# Patient Record
Sex: FEMALE | Race: WHITE | NOT HISPANIC OR LATINO | Employment: OTHER | URBAN - METROPOLITAN AREA
[De-identification: names, ages, dates, MRNs, and addresses within clinical notes are randomized per-mention and may not be internally consistent; named-entity substitution may affect disease eponyms.]

---

## 2017-06-25 ENCOUNTER — HOSPITAL ENCOUNTER (EMERGENCY)
Facility: HOSPITAL | Age: 57
Discharge: ANOTHER HOSPITAL | End: 2017-06-25
Attending: EMERGENCY MEDICINE
Payer: MEDICAID

## 2017-06-25 VITALS
OXYGEN SATURATION: 95 % | BODY MASS INDEX: 43.58 KG/M2 | WEIGHT: 270 LBS | HEART RATE: 80 BPM | RESPIRATION RATE: 17 BRPM | SYSTOLIC BLOOD PRESSURE: 130 MMHG | TEMPERATURE: 97 F | DIASTOLIC BLOOD PRESSURE: 70 MMHG

## 2017-06-25 DIAGNOSIS — R06.89 HYPERCAPNIA: Primary | ICD-10-CM

## 2017-06-25 LAB
ALBUMIN SERPL BCP-MCNC: 3.2 G/DL
ALLENS TEST: ABNORMAL
ALP SERPL-CCNC: 63 U/L
ALT SERPL W/O P-5'-P-CCNC: 8 U/L
AMPHET+METHAMPHET UR QL: NEGATIVE
ANION GAP SERPL CALC-SCNC: 9 MMOL/L
ANISOCYTOSIS BLD QL SMEAR: SLIGHT
AST SERPL-CCNC: 11 U/L
BACTERIA #/AREA URNS HPF: ABNORMAL /HPF
BARBITURATES UR QL SCN>200 NG/ML: NEGATIVE
BASO STIPL BLD QL SMEAR: ABNORMAL
BASOPHILS NFR BLD: 0 %
BENZODIAZ UR QL SCN>200 NG/ML: NORMAL
BILIRUB SERPL-MCNC: 0.3 MG/DL
BILIRUB UR QL STRIP: NEGATIVE
BUN SERPL-MCNC: 42 MG/DL
BZE UR QL SCN: NEGATIVE
CALCIUM SERPL-MCNC: 9.4 MG/DL
CANNABINOIDS UR QL SCN: NEGATIVE
CHLORIDE SERPL-SCNC: 95 MMOL/L
CLARITY UR: CLEAR
CO2 SERPL-SCNC: 40 MMOL/L
COLOR UR: YELLOW
CREAT SERPL-MCNC: 1 MG/DL
CREAT UR-MCNC: 27.3 MG/DL
DELSYS: ABNORMAL
DIFFERENTIAL METHOD: ABNORMAL
EOSINOPHIL NFR BLD: 4 %
ERYTHROCYTE [DISTWIDTH] IN BLOOD BY AUTOMATED COUNT: 16.4 %
EST. GFR  (AFRICAN AMERICAN): >60 ML/MIN/1.73 M^2
EST. GFR  (NON AFRICAN AMERICAN): >60 ML/MIN/1.73 M^2
FIO2: 28 (ref 21–100)
GIANT PLATELETS BLD QL SMEAR: PRESENT
GLUCOSE SERPL-MCNC: 111 MG/DL
GLUCOSE UR QL STRIP: NEGATIVE
HCO3 UR-SCNC: 47.8 MEQ/L (ref 22–26)
HCT VFR BLD AUTO: 35.6 %
HGB BLD-MCNC: 10.5 G/DL
HGB UR QL STRIP: NEGATIVE
HYPOCHROMIA BLD QL SMEAR: ABNORMAL
KETONES UR QL STRIP: NEGATIVE
LEUKOCYTE ESTERASE UR QL STRIP: ABNORMAL
LYMPHOCYTES NFR BLD: 61 %
MCH RBC QN AUTO: 29.2 PG
MCHC RBC AUTO-ENTMCNC: 29.5 %
MCV RBC AUTO: 99 FL
METHADONE UR QL SCN>300 NG/ML: NEGATIVE
MICROSCOPIC COMMENT: ABNORMAL
MODE: ABNORMAL
MONOCYTES NFR BLD: 5 %
NEUTROPHILS NFR BLD: 27 %
NEUTS BAND NFR BLD MANUAL: 3 %
NITRITE UR QL STRIP: NEGATIVE
OPIATES UR QL SCN: NORMAL
PCO2 BLDA: 95 MMHG (ref 35–45)
PCP UR QL SCN>25 NG/ML: NEGATIVE
PH SMN: 7.31 [PH] (ref 7.35–7.45)
PH UR STRIP: 6 [PH] (ref 5–8)
PLATELET # BLD AUTO: 199 K/UL
PLATELET BLD QL SMEAR: ABNORMAL
PMV BLD AUTO: 13 FL
PO2 BLDA: 88 MMHG (ref 80–100)
POC BE: 17.1 MEQ/L (ref -2–2)
POC SATURATED O2: 96 % (ref 90–100)
POCT GLUCOSE: 125 MG/DL (ref 70–110)
POCT GLUCOSE: 74 MG/DL (ref 70–110)
POCT GLUCOSE: 98 MG/DL (ref 70–110)
POTASSIUM SERPL-SCNC: 4.9 MMOL/L
PROT SERPL-MCNC: 6.4 G/DL
PROT UR QL STRIP: NEGATIVE
RBC # BLD AUTO: 3.59 M/UL
RBC #/AREA URNS HPF: 1 /HPF (ref 0–4)
SCHISTOCYTES BLD QL SMEAR: ABNORMAL
SITE: ABNORMAL
SODIUM SERPL-SCNC: 144 MMOL/L
SP GR UR STRIP: 1.01 (ref 1–1.03)
SPHEROCYTES BLD QL SMEAR: ABNORMAL
SQUAMOUS #/AREA URNS HPF: 1 /HPF
STOMATOCYTES BLD QL SMEAR: PRESENT
TOXICOLOGY INFORMATION: NORMAL
URN SPEC COLLECT METH UR: ABNORMAL
UROBILINOGEN UR STRIP-ACNC: NEGATIVE EU/DL
WBC # BLD AUTO: 12.47 K/UL
WBC #/AREA URNS HPF: 1 /HPF (ref 0–5)
WBC TOXIC VACUOLES BLD QL SMEAR: PRESENT

## 2017-06-25 PROCEDURE — 82962 GLUCOSE BLOOD TEST: CPT

## 2017-06-25 PROCEDURE — 85007 BL SMEAR W/DIFF WBC COUNT: CPT

## 2017-06-25 PROCEDURE — 36600 WITHDRAWAL OF ARTERIAL BLOOD: CPT | Mod: 59

## 2017-06-25 PROCEDURE — 99900035 HC TECH TIME PER 15 MIN (STAT)

## 2017-06-25 PROCEDURE — 27000190 HC CPAP FULL FACE MASK W/VALVE

## 2017-06-25 PROCEDURE — 94660 CPAP INITIATION&MGMT: CPT

## 2017-06-25 PROCEDURE — 94640 AIRWAY INHALATION TREATMENT: CPT

## 2017-06-25 PROCEDURE — 81000 URINALYSIS NONAUTO W/SCOPE: CPT | Mod: 59

## 2017-06-25 PROCEDURE — 36415 COLL VENOUS BLD VENIPUNCTURE: CPT

## 2017-06-25 PROCEDURE — 25000003 PHARM REV CODE 250: Performed by: EMERGENCY MEDICINE

## 2017-06-25 PROCEDURE — 80307 DRUG TEST PRSMV CHEM ANLYZR: CPT

## 2017-06-25 PROCEDURE — 51702 INSERT TEMP BLADDER CATH: CPT

## 2017-06-25 PROCEDURE — 80053 COMPREHEN METABOLIC PANEL: CPT

## 2017-06-25 PROCEDURE — 82803 BLOOD GASES ANY COMBINATION: CPT | Performed by: EMERGENCY MEDICINE

## 2017-06-25 PROCEDURE — 85027 COMPLETE CBC AUTOMATED: CPT

## 2017-06-25 PROCEDURE — 99285 EMERGENCY DEPT VISIT HI MDM: CPT | Mod: 25

## 2017-06-25 RX ORDER — HYDROCODONE BITARTRATE AND ACETAMINOPHEN 10; 325 MG/1; MG/1
1 TABLET ORAL
Status: ON HOLD | COMMUNITY
End: 2017-06-28 | Stop reason: HOSPADM

## 2017-06-25 RX ORDER — LANOLIN ALCOHOL/MO/W.PET/CERES
400 CREAM (GRAM) TOPICAL DAILY
Status: ON HOLD | COMMUNITY
End: 2017-06-28 | Stop reason: HOSPADM

## 2017-06-25 RX ORDER — FUROSEMIDE 40 MG/1
40 TABLET ORAL 2 TIMES DAILY
Status: ON HOLD | COMMUNITY
End: 2017-06-28 | Stop reason: HOSPADM

## 2017-06-25 RX ORDER — PROMETHAZINE HYDROCHLORIDE 25 MG/1
25 TABLET ORAL EVERY 4 HOURS
Status: ON HOLD | COMMUNITY
End: 2017-06-28 | Stop reason: HOSPADM

## 2017-06-25 RX ORDER — DIAZEPAM 5 MG/1
5 TABLET ORAL 3 TIMES DAILY PRN
Status: ON HOLD | COMMUNITY
End: 2017-06-28 | Stop reason: HOSPADM

## 2017-06-25 RX ORDER — BACLOFEN 10 MG/1
10 TABLET ORAL 3 TIMES DAILY
Status: ON HOLD | COMMUNITY
End: 2017-06-28 | Stop reason: HOSPADM

## 2017-06-25 RX ORDER — IPRATROPIUM BROMIDE 0.5 MG/2.5ML
500 SOLUTION RESPIRATORY (INHALATION) 4 TIMES DAILY
Status: ON HOLD | COMMUNITY
End: 2018-10-17 | Stop reason: HOSPADM

## 2017-06-25 RX ORDER — LEVALBUTEROL INHALATION SOLUTION 0.63 MG/3ML
1.25 SOLUTION RESPIRATORY (INHALATION)
Status: COMPLETED | OUTPATIENT
Start: 2017-06-25 | End: 2017-06-25

## 2017-06-25 RX ORDER — ESCITALOPRAM OXALATE 10 MG/1
10 TABLET ORAL NIGHTLY
Status: ON HOLD | COMMUNITY
End: 2019-05-28 | Stop reason: HOSPADM

## 2017-06-25 RX ORDER — CIPROFLOXACIN 500 MG/1
500 TABLET ORAL
Status: ON HOLD | COMMUNITY
End: 2017-06-28 | Stop reason: HOSPADM

## 2017-06-25 RX ORDER — FLUTICASONE PROPIONATE 50 UG/1
POWDER, METERED RESPIRATORY (INHALATION)
Status: ON HOLD | COMMUNITY
End: 2017-06-28 | Stop reason: HOSPADM

## 2017-06-25 RX ORDER — LISINOPRIL 2.5 MG/1
2.5 TABLET ORAL DAILY
Status: ON HOLD | COMMUNITY
End: 2017-09-30

## 2017-06-25 RX ORDER — GABAPENTIN 300 MG/1
300 CAPSULE ORAL 3 TIMES DAILY
COMMUNITY

## 2017-06-25 RX ADMIN — LEVALBUTEROL HYDROCHLORIDE 1.25 MG: 0.63 SOLUTION RESPIRATORY (INHALATION) at 08:06
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
## 2017-06-26 ENCOUNTER — HOSPITAL ENCOUNTER (INPATIENT)
Facility: HOSPITAL | Age: 57
LOS: 2 days | Discharge: NURSING, REHAB OR LONG-TERM CARE | DRG: 189 | End: 2017-06-28
Attending: FAMILY MEDICINE | Admitting: FAMILY MEDICINE
Payer: MEDICAID

## 2017-06-26 DIAGNOSIS — I50.9 CHF (CONGESTIVE HEART FAILURE): ICD-10-CM

## 2017-06-26 DIAGNOSIS — J44.9 CHRONIC OBSTRUCTIVE PULMONARY DISEASE, UNSPECIFIED COPD TYPE: ICD-10-CM

## 2017-06-26 DIAGNOSIS — E03.9 ACQUIRED HYPOTHYROIDISM: ICD-10-CM

## 2017-06-26 DIAGNOSIS — F31.9 BIPOLAR 1 DISORDER: ICD-10-CM

## 2017-06-26 DIAGNOSIS — J96.92 HYPERCAPNIC RESPIRATORY FAILURE: ICD-10-CM

## 2017-06-26 DIAGNOSIS — I10 ESSENTIAL HYPERTENSION: ICD-10-CM

## 2017-06-26 DIAGNOSIS — J96.22 ACUTE ON CHRONIC RESPIRATORY FAILURE WITH HYPERCAPNIA: ICD-10-CM

## 2017-06-26 DIAGNOSIS — R56.9 SEIZURE: ICD-10-CM

## 2017-06-26 DIAGNOSIS — J96.02 ACUTE RESPIRATORY FAILURE WITH HYPERCAPNIA: ICD-10-CM

## 2017-06-26 DIAGNOSIS — J42 CHRONIC BRONCHITIS, UNSPECIFIED CHRONIC BRONCHITIS TYPE: ICD-10-CM

## 2017-06-26 DIAGNOSIS — J96.92 RESPIRATORY FAILURE WITH HYPERCAPNIA, UNSPECIFIED CHRONICITY: ICD-10-CM

## 2017-06-26 DIAGNOSIS — G93.40 ACUTE ENCEPHALOPATHY: ICD-10-CM

## 2017-06-26 LAB
ALBUMIN SERPL BCP-MCNC: 3 G/DL
ALBUMIN SERPL BCP-MCNC: 3.3 G/DL
ALLENS TEST: ABNORMAL
ALLENS TEST: ABNORMAL
ALP SERPL-CCNC: 57 U/L
ALP SERPL-CCNC: 67 U/L
ALT SERPL W/O P-5'-P-CCNC: 10 U/L
ALT SERPL W/O P-5'-P-CCNC: 11 U/L
AMMONIA PLAS-SCNC: 100 UMOL/L
AMMONIA PLAS-SCNC: 44 UMOL/L
ANION GAP SERPL CALC-SCNC: 7 MMOL/L
ANION GAP SERPL CALC-SCNC: 8 MMOL/L
AST SERPL-CCNC: 14 U/L
AST SERPL-CCNC: 14 U/L
BASOPHILS # BLD AUTO: 0.03 K/UL
BASOPHILS NFR BLD: 0.3 %
BILIRUB SERPL-MCNC: 0.3 MG/DL
BILIRUB SERPL-MCNC: 0.3 MG/DL
BUN SERPL-MCNC: 32 MG/DL
BUN SERPL-MCNC: 43 MG/DL
CALCIUM SERPL-MCNC: 10.1 MG/DL
CALCIUM SERPL-MCNC: 9.5 MG/DL
CHLORIDE SERPL-SCNC: 95 MMOL/L
CHLORIDE SERPL-SCNC: 98 MMOL/L
CO2 SERPL-SCNC: 39 MMOL/L
CO2 SERPL-SCNC: 42 MMOL/L
CREAT SERPL-MCNC: 0.9 MG/DL
CREAT SERPL-MCNC: 0.9 MG/DL
DELSYS: ABNORMAL
DELSYS: ABNORMAL
DIFFERENTIAL METHOD: ABNORMAL
EOSINOPHIL # BLD AUTO: 0.3 K/UL
EOSINOPHIL NFR BLD: 3 %
EP: 5
EP: 5
ERYTHROCYTE [DISTWIDTH] IN BLOOD BY AUTOMATED COUNT: 16.1 %
ERYTHROCYTE [SEDIMENTATION RATE] IN BLOOD BY WESTERGREN METHOD: 4 MM/H
ERYTHROCYTE [SEDIMENTATION RATE] IN BLOOD BY WESTERGREN METHOD: 4 MM/H
EST. GFR  (AFRICAN AMERICAN): >60 ML/MIN/1.73 M^2
EST. GFR  (AFRICAN AMERICAN): >60 ML/MIN/1.73 M^2
EST. GFR  (NON AFRICAN AMERICAN): >60 ML/MIN/1.73 M^2
EST. GFR  (NON AFRICAN AMERICAN): >60 ML/MIN/1.73 M^2
FIO2: 35
FIO2: 40
GLUCOSE SERPL-MCNC: 101 MG/DL
GLUCOSE SERPL-MCNC: 67 MG/DL
HCO3 UR-SCNC: 43.8 MMOL/L (ref 24–28)
HCO3 UR-SCNC: 46.9 MMOL/L (ref 24–28)
HCT VFR BLD AUTO: 34 %
HGB BLD-MCNC: 10.2 G/DL
IP: 15
IP: 15
LYMPHOCYTES # BLD AUTO: 6 K/UL
LYMPHOCYTES NFR BLD: 56.6 %
MAGNESIUM SERPL-MCNC: 1.5 MG/DL
MCH RBC QN AUTO: 29.1 PG
MCHC RBC AUTO-ENTMCNC: 30 %
MCV RBC AUTO: 97 FL
MIN VOL: 7
MODE: ABNORMAL
MODE: ABNORMAL
MONOCYTES # BLD AUTO: 1.2 K/UL
MONOCYTES NFR BLD: 11.8 %
NEUTROPHILS # BLD AUTO: 3 K/UL
NEUTROPHILS NFR BLD: 28.3 %
PCO2 BLDA: 79.1 MMHG (ref 35–45)
PCO2 BLDA: 93.5 MMHG (ref 35–45)
PH SMN: 7.31 [PH] (ref 7.35–7.45)
PH SMN: 7.35 [PH] (ref 7.35–7.45)
PHOSPHATE SERPL-MCNC: 5 MG/DL
PLATELET # BLD AUTO: 199 K/UL
PMV BLD AUTO: 12.9 FL
PO2 BLDA: 103 MMHG (ref 80–100)
PO2 BLDA: 80 MMHG (ref 80–100)
POC BE: 18 MMOL/L
POC BE: 21 MMOL/L
POC SATURATED O2: 94 % (ref 95–100)
POC SATURATED O2: 97 % (ref 95–100)
POC TCO2: 46 MMOL/L (ref 23–27)
POC TCO2: 50 MMOL/L (ref 23–27)
POCT GLUCOSE: 131 MG/DL (ref 70–110)
POCT GLUCOSE: 156 MG/DL (ref 70–110)
POCT GLUCOSE: 172 MG/DL (ref 70–110)
POCT GLUCOSE: 74 MG/DL (ref 70–110)
POCT GLUCOSE: 77 MG/DL (ref 70–110)
POCT GLUCOSE: 80 MG/DL (ref 70–110)
POCT GLUCOSE: 86 MG/DL (ref 70–110)
POCT GLUCOSE: 89 MG/DL (ref 70–110)
POTASSIUM SERPL-SCNC: 4.7 MMOL/L
POTASSIUM SERPL-SCNC: 4.7 MMOL/L
PROT SERPL-MCNC: 6.1 G/DL
PROT SERPL-MCNC: 6.9 G/DL
RBC # BLD AUTO: 3.5 M/UL
SAMPLE: ABNORMAL
SAMPLE: ABNORMAL
SITE: ABNORMAL
SITE: ABNORMAL
SODIUM SERPL-SCNC: 144 MMOL/L
SODIUM SERPL-SCNC: 145 MMOL/L
SP02: 100
SP02: 100
SPONT RATE: 23
SPONT RATE: 6
T4 FREE SERPL-MCNC: 0.74 NG/DL
TSH SERPL DL<=0.005 MIU/L-ACNC: 1.26 UIU/ML
VALPROATE SERPL-MCNC: 40 UG/ML
WBC # BLD AUTO: 10.55 K/UL

## 2017-06-26 PROCEDURE — 84439 ASSAY OF FREE THYROXINE: CPT

## 2017-06-26 PROCEDURE — 80053 COMPREHEN METABOLIC PANEL: CPT

## 2017-06-26 PROCEDURE — 27000221 HC OXYGEN, UP TO 24 HOURS

## 2017-06-26 PROCEDURE — 94660 CPAP INITIATION&MGMT: CPT

## 2017-06-26 PROCEDURE — 27000190 HC CPAP FULL FACE MASK W/VALVE

## 2017-06-26 PROCEDURE — 82140 ASSAY OF AMMONIA: CPT

## 2017-06-26 PROCEDURE — 80164 ASSAY DIPROPYLACETIC ACD TOT: CPT

## 2017-06-26 PROCEDURE — 85025 COMPLETE CBC W/AUTO DIFF WBC: CPT

## 2017-06-26 PROCEDURE — 94761 N-INVAS EAR/PLS OXIMETRY MLT: CPT

## 2017-06-26 PROCEDURE — 94640 AIRWAY INHALATION TREATMENT: CPT

## 2017-06-26 PROCEDURE — 36600 WITHDRAWAL OF ARTERIAL BLOOD: CPT

## 2017-06-26 PROCEDURE — 93306 TTE W/DOPPLER COMPLETE: CPT

## 2017-06-26 PROCEDURE — 82803 BLOOD GASES ANY COMBINATION: CPT

## 2017-06-26 PROCEDURE — 82140 ASSAY OF AMMONIA: CPT | Mod: 91

## 2017-06-26 PROCEDURE — 84100 ASSAY OF PHOSPHORUS: CPT

## 2017-06-26 PROCEDURE — 25000242 PHARM REV CODE 250 ALT 637 W/ HCPCS: Performed by: FAMILY MEDICINE

## 2017-06-26 PROCEDURE — 63600175 PHARM REV CODE 636 W HCPCS: Performed by: FAMILY MEDICINE

## 2017-06-26 PROCEDURE — 25000003 PHARM REV CODE 250: Performed by: FAMILY MEDICINE

## 2017-06-26 PROCEDURE — 63600175 PHARM REV CODE 636 W HCPCS: Performed by: INTERNAL MEDICINE

## 2017-06-26 PROCEDURE — 80053 COMPREHEN METABOLIC PANEL: CPT | Mod: 91

## 2017-06-26 PROCEDURE — 93306 TTE W/DOPPLER COMPLETE: CPT | Mod: 26,,, | Performed by: INTERNAL MEDICINE

## 2017-06-26 PROCEDURE — 84443 ASSAY THYROID STIM HORMONE: CPT

## 2017-06-26 PROCEDURE — 83735 ASSAY OF MAGNESIUM: CPT

## 2017-06-26 PROCEDURE — 36415 COLL VENOUS BLD VENIPUNCTURE: CPT

## 2017-06-26 PROCEDURE — 20000000 HC ICU ROOM

## 2017-06-26 PROCEDURE — 99900035 HC TECH TIME PER 15 MIN (STAT)

## 2017-06-26 PROCEDURE — 87086 URINE CULTURE/COLONY COUNT: CPT

## 2017-06-26 RX ORDER — ASPIRIN 81 MG/1
81 TABLET ORAL DAILY
Status: DISCONTINUED | OUTPATIENT
Start: 2017-06-26 | End: 2017-06-28 | Stop reason: HOSPADM

## 2017-06-26 RX ORDER — LISINOPRIL 2.5 MG/1
2.5 TABLET ORAL DAILY
Status: DISCONTINUED | OUTPATIENT
Start: 2017-06-26 | End: 2017-06-26

## 2017-06-26 RX ORDER — LORAZEPAM 0.5 MG/1
0.5 TABLET ORAL NIGHTLY PRN
Status: DISCONTINUED | OUTPATIENT
Start: 2017-06-26 | End: 2017-06-26

## 2017-06-26 RX ORDER — SODIUM CHLORIDE 0.9 % (FLUSH) 0.9 %
3 SYRINGE (ML) INJECTION EVERY 8 HOURS
Status: DISCONTINUED | OUTPATIENT
Start: 2017-06-26 | End: 2017-06-28 | Stop reason: HOSPADM

## 2017-06-26 RX ORDER — NALOXONE HCL 0.4 MG/ML
0.4 VIAL (ML) INJECTION
Status: DISCONTINUED | OUTPATIENT
Start: 2017-06-26 | End: 2017-06-26

## 2017-06-26 RX ORDER — IPRATROPIUM BROMIDE AND ALBUTEROL SULFATE 2.5; .5 MG/3ML; MG/3ML
3 SOLUTION RESPIRATORY (INHALATION) EVERY 4 HOURS PRN
Status: DISCONTINUED | OUTPATIENT
Start: 2017-06-26 | End: 2017-06-28 | Stop reason: HOSPADM

## 2017-06-26 RX ORDER — ALLOPURINOL 100 MG/1
100 TABLET ORAL 2 TIMES DAILY
Status: DISCONTINUED | OUTPATIENT
Start: 2017-06-26 | End: 2017-06-26

## 2017-06-26 RX ORDER — BACLOFEN 10 MG/1
10 TABLET ORAL 3 TIMES DAILY
Status: DISCONTINUED | OUTPATIENT
Start: 2017-06-26 | End: 2017-06-26

## 2017-06-26 RX ORDER — LEVOTHYROXINE SODIUM 100 UG/1
100 TABLET ORAL
Status: DISCONTINUED | OUTPATIENT
Start: 2017-06-26 | End: 2017-06-28 | Stop reason: HOSPADM

## 2017-06-26 RX ORDER — CIPROFLOXACIN 2 MG/ML
400 INJECTION, SOLUTION INTRAVENOUS
Status: DISCONTINUED | OUTPATIENT
Start: 2017-06-26 | End: 2017-06-26

## 2017-06-26 RX ORDER — IPRATROPIUM BROMIDE 0.5 MG/2.5ML
500 SOLUTION RESPIRATORY (INHALATION) 4 TIMES DAILY
Status: DISCONTINUED | OUTPATIENT
Start: 2017-06-26 | End: 2017-06-26

## 2017-06-26 RX ORDER — ENOXAPARIN SODIUM 100 MG/ML
40 INJECTION SUBCUTANEOUS EVERY 24 HOURS
Status: DISCONTINUED | OUTPATIENT
Start: 2017-06-26 | End: 2017-06-28 | Stop reason: HOSPADM

## 2017-06-26 RX ORDER — ZOLPIDEM TARTRATE 5 MG/1
10 TABLET ORAL NIGHTLY PRN
Status: DISCONTINUED | OUTPATIENT
Start: 2017-06-26 | End: 2017-06-26

## 2017-06-26 RX ORDER — DIVALPROEX SODIUM 500 MG/1
1500 TABLET, FILM COATED, EXTENDED RELEASE ORAL 2 TIMES DAILY
Status: DISCONTINUED | OUTPATIENT
Start: 2017-06-26 | End: 2017-06-28 | Stop reason: HOSPADM

## 2017-06-26 RX ORDER — ACETAMINOPHEN 325 MG/1
650 TABLET ORAL ONCE
Status: COMPLETED | OUTPATIENT
Start: 2017-06-26 | End: 2017-06-26

## 2017-06-26 RX ORDER — DILTIAZEM HYDROCHLORIDE 120 MG/1
240 CAPSULE, COATED, EXTENDED RELEASE ORAL EVERY MORNING
Status: DISCONTINUED | OUTPATIENT
Start: 2017-06-26 | End: 2017-06-26

## 2017-06-26 RX ORDER — MAGNESIUM SULFATE HEPTAHYDRATE 40 MG/ML
2 INJECTION, SOLUTION INTRAVENOUS ONCE
Status: COMPLETED | OUTPATIENT
Start: 2017-06-26 | End: 2017-06-26

## 2017-06-26 RX ORDER — PANTOPRAZOLE SODIUM 40 MG/1
40 TABLET, DELAYED RELEASE ORAL NIGHTLY
Status: DISCONTINUED | OUTPATIENT
Start: 2017-06-26 | End: 2017-06-28 | Stop reason: HOSPADM

## 2017-06-26 RX ORDER — FLUTICASONE FUROATE AND VILANTEROL 100; 25 UG/1; UG/1
1 POWDER RESPIRATORY (INHALATION) DAILY
Status: DISCONTINUED | OUTPATIENT
Start: 2017-06-26 | End: 2017-06-28 | Stop reason: HOSPADM

## 2017-06-26 RX ORDER — IPRATROPIUM BROMIDE AND ALBUTEROL SULFATE 2.5; .5 MG/3ML; MG/3ML
3 SOLUTION RESPIRATORY (INHALATION) EVERY 4 HOURS
Status: DISCONTINUED | OUTPATIENT
Start: 2017-06-26 | End: 2017-06-28 | Stop reason: HOSPADM

## 2017-06-26 RX ORDER — ONDANSETRON 2 MG/ML
4 INJECTION INTRAMUSCULAR; INTRAVENOUS EVERY 12 HOURS PRN
Status: DISCONTINUED | OUTPATIENT
Start: 2017-06-26 | End: 2017-06-28 | Stop reason: HOSPADM

## 2017-06-26 RX ORDER — DEXTROSE MONOHYDRATE AND SODIUM CHLORIDE 5; .9 G/100ML; G/100ML
INJECTION, SOLUTION INTRAVENOUS CONTINUOUS
Status: DISCONTINUED | OUTPATIENT
Start: 2017-06-26 | End: 2017-06-28 | Stop reason: HOSPADM

## 2017-06-26 RX ORDER — CITALOPRAM 20 MG/1
60 TABLET, FILM COATED ORAL NIGHTLY
Status: DISCONTINUED | OUTPATIENT
Start: 2017-06-26 | End: 2017-06-26

## 2017-06-26 RX ORDER — FLUTICASONE PROPIONATE 44 UG/1
1 AEROSOL, METERED RESPIRATORY (INHALATION) EVERY 12 HOURS
Status: DISCONTINUED | OUTPATIENT
Start: 2017-06-26 | End: 2017-06-26

## 2017-06-26 RX ORDER — FUROSEMIDE 40 MG/1
40 TABLET ORAL 2 TIMES DAILY
Status: DISCONTINUED | OUTPATIENT
Start: 2017-06-26 | End: 2017-06-26

## 2017-06-26 RX ORDER — PROBENECID 500 MG/1
250 TABLET, FILM COATED ORAL 2 TIMES DAILY
Status: DISCONTINUED | OUTPATIENT
Start: 2017-06-26 | End: 2017-06-26

## 2017-06-26 RX ORDER — GABAPENTIN 300 MG/1
300 CAPSULE ORAL 3 TIMES DAILY
Status: DISCONTINUED | OUTPATIENT
Start: 2017-06-26 | End: 2017-06-28 | Stop reason: HOSPADM

## 2017-06-26 RX ORDER — THEOPHYLLINE 100 MG/1
300 TABLET, EXTENDED RELEASE ORAL EVERY 12 HOURS
Status: DISCONTINUED | OUTPATIENT
Start: 2017-06-26 | End: 2017-06-26

## 2017-06-26 RX ORDER — NALOXONE HCL 0.4 MG/ML
0.4 VIAL (ML) INJECTION EVERY 30 MIN PRN
Status: DISCONTINUED | OUTPATIENT
Start: 2017-06-26 | End: 2017-06-28 | Stop reason: HOSPADM

## 2017-06-26 RX ORDER — PRAVASTATIN SODIUM 20 MG/1
20 TABLET ORAL NIGHTLY
Status: DISCONTINUED | OUTPATIENT
Start: 2017-06-26 | End: 2017-06-28 | Stop reason: HOSPADM

## 2017-06-26 RX ADMIN — DIVALPROEX SODIUM 1500 MG: 500 TABLET, EXTENDED RELEASE ORAL at 09:06

## 2017-06-26 RX ADMIN — IPRATROPIUM BROMIDE AND ALBUTEROL SULFATE 3 ML: .5; 3 SOLUTION RESPIRATORY (INHALATION) at 07:06

## 2017-06-26 RX ADMIN — SODIUM CHLORIDE, PRESERVATIVE FREE 3 ML: 5 INJECTION INTRAVENOUS at 03:06

## 2017-06-26 RX ADMIN — PRAVASTATIN SODIUM 20 MG: 20 TABLET ORAL at 09:06

## 2017-06-26 RX ADMIN — NALOXONE HYDROCHLORIDE 0.4 MG: 0.4 INJECTION, SOLUTION INTRAMUSCULAR; INTRAVENOUS; SUBCUTANEOUS at 08:06

## 2017-06-26 RX ADMIN — CIPROFLOXACIN 400 MG: 2 INJECTION, SOLUTION INTRAVENOUS at 03:06

## 2017-06-26 RX ADMIN — GABAPENTIN 300 MG: 300 CAPSULE ORAL at 09:06

## 2017-06-26 RX ADMIN — IPRATROPIUM BROMIDE AND ALBUTEROL SULFATE 3 ML: .5; 3 SOLUTION RESPIRATORY (INHALATION) at 03:06

## 2017-06-26 RX ADMIN — NALOXONE HYDROCHLORIDE 0.4 MG: 0.4 INJECTION, SOLUTION INTRAMUSCULAR; INTRAVENOUS; SUBCUTANEOUS at 10:06

## 2017-06-26 RX ADMIN — MAGNESIUM SULFATE HEPTAHYDRATE 2 G: 40 INJECTION, SOLUTION INTRAVENOUS at 08:06

## 2017-06-26 RX ADMIN — ONDANSETRON 4 MG: 2 INJECTION INTRAMUSCULAR; INTRAVENOUS at 03:06

## 2017-06-26 RX ADMIN — CIPROFLOXACIN 400 MG: 2 INJECTION, SOLUTION INTRAVENOUS at 04:06

## 2017-06-26 RX ADMIN — ENOXAPARIN SODIUM 40 MG: 100 INJECTION SUBCUTANEOUS at 03:06

## 2017-06-26 RX ADMIN — ACETAMINOPHEN 650 MG: 325 TABLET ORAL at 03:06

## 2017-06-26 RX ADMIN — IPRATROPIUM BROMIDE AND ALBUTEROL SULFATE 3 ML: .5; 3 SOLUTION RESPIRATORY (INHALATION) at 04:06

## 2017-06-26 RX ADMIN — ACETAMINOPHEN 650 MG: 325 TABLET ORAL at 09:06

## 2017-06-26 RX ADMIN — PANTOPRAZOLE SODIUM 40 MG: 40 TABLET, DELAYED RELEASE ORAL at 09:06

## 2017-06-26 RX ADMIN — SODIUM CHLORIDE, PRESERVATIVE FREE 3 ML: 5 INJECTION INTRAVENOUS at 04:06

## 2017-06-26 RX ADMIN — FLUTICASONE FUROATE AND VILANTEROL TRIFENATATE 1 PUFF: 100; 25 POWDER RESPIRATORY (INHALATION) at 11:06

## 2017-06-26 RX ADMIN — IPRATROPIUM BROMIDE AND ALBUTEROL SULFATE 3 ML: .5; 3 SOLUTION RESPIRATORY (INHALATION) at 11:06

## 2017-06-26 RX ADMIN — SODIUM CHLORIDE, PRESERVATIVE FREE 3 ML: 5 INJECTION INTRAVENOUS at 09:06

## 2017-06-26 NOTE — ED NOTES
Attempted to call family per patient's request. Unable to contact any family members with numbers provided by patient.

## 2017-06-26 NOTE — PLAN OF CARE
[facility] staff called unit to report that patients PCP request to stop cipro due to negative in office UA result. Team notified

## 2017-06-26 NOTE — PROGRESS NOTES
Pt. On cont. BiPAP very lethargic in no apparent distress.  BiPAP working ok, alarms working and are audible.

## 2017-06-26 NOTE — PROGRESS NOTES
Progress Note  [facility]  Admit Date: 6/26/2017   LOS: 0 days   SUBJECTIVE:   Follow-up For: Hypercapnia    Patient seen and examined this AM. No acute patient overnight. Patient remains lethargic but responding to verbal stimuli.    Review of Systems   Unable to perform ROS: Patient nonverbal     OBJECTIVE:   Vital Signs (Most Recent)  Temp: 98.5 °F (36.9 °C) (06/26/17 0701)  Pulse: 63 (06/26/17 0722)  Resp: (!) 21 (06/26/17 0722)  BP: (!) 122/58 (06/26/17 0630)  SpO2: 100 % (06/26/17 0722)    I & O (Last 24H):  Intake/Output Summary (Last 24 hours) at 06/26/17 0920  Last data filed at 06/26/17 0700   Gross per 24 hour   Intake              200 ml   Output              695 ml   Net             -495 ml     Wt Readings from Last 3 Encounters:   06/26/17 (!) 138.1 kg (304 lb 7.3 oz)   06/25/17 122.5 kg (270 lb)   08/22/16 129.3 kg (285 lb)       Current Diet Order   Procedures    Diet NPO        Physical Exam   Constitutional: No distress.   HENT:   Head: Normocephalic and atraumatic.   BiPAP mask in place   Cardiovascular: Normal rate, regular rhythm and normal heart sounds.    No murmur heard.  Pulmonary/Chest: Effort normal and breath sounds normal. She has no wheezes. She has no rales.   Abdominal: Soft. Bowel sounds are normal. She exhibits no distension. There is no tenderness.   Musculoskeletal: She exhibits no edema or tenderness.   Neurological:   Lethargic but responsive to verbal stimuli. GCS 8   Skin: She is not diaphoretic.     Laboratory Data:  CBC    Recent Labs  Lab 06/25/17 1908 06/26/17  0415   WBC 12.47 10.55   RBC 3.59* 3.50*   HGB 10.5* 10.2*   HCT 35.6* 34.0*    199   MCV 99* 97   MCH 29.2 29.1   MCHC 29.5* 30.0*     CMP    Recent Labs  Lab 06/25/17  1908 06/26/17  0415   CALCIUM 9.4 9.5   PROT 6.4 6.1    144   K 4.9 4.7   CO2 40* 42*   CL 95 95   BUN 42* 43*   CREATININE 1.0 0.9   ALKPHOS 63 57   ALT 8* 10   AST 11 14   BILITOT 0.3 0.3     POCT-Glucose  POCT Glucose    Date Value Ref Range Status   06/26/2017 80 70 - 110 mg/dL Final   06/26/2017 77 70 - 110 mg/dL Final   06/26/2017 74 70 - 110 mg/dL Final   06/25/2017 125 (H) 70 - 110 mg/dL Final   06/25/2017 74 70 - 110 mg/dL Final   06/25/2017 98 70 - 110 mg/dL Final     UA    Recent Labs  Lab 06/25/17  1947   COLORU Yellow   SPECGRAV 1.010   PHUR 6.0   PROTEINUA Negative   BACTERIA Few*     MICRO  Microbiology Results (last 7 days)     Procedure Component Value Units Date/Time    Urine culture [[id number]] Collected:  06/26/17 0404    Order Status:  Sent Specimen:  Urine from Urine, Catheterized Updated:  06/26/17 0636        CXR  1.  Cardiomegaly.  2.  Findings suspicious for early congestive heart failure.    ASSESSMENT/PLAN:   Michelle Godfrey is a 56 y.o. female with PMHx of COPD, DM2, HTN, Bipolar d/o, Hypercapnic respiratory failure, Hypothyroidism found to be hypoglycemic and hypercapnic now on BIPAP.     Plan: Admit to [facility]     Neuro:   - Mentation altered. Hypoglycemia versus acute respiratory failure  - GCS 8  - CAM ICU negative  - CT scan negative for acute process. Utox positive for Benzos and Opiates (home Norco and Diazepam PRN)  - Will hold home Baclofen, Lexapro, Diazepam, Norco, Depakote until mentation improved. Will need med reconciliation prior to d/c  - Per nurse, upon arrival to ICU, patient more talkative and AAOx3  - Monitor mentation and f/u ABGs, BGs, valproic acid     CV:  - BP stable 100s-110s/50s-60s.  - /80 this AM   - Continue to hold home Diltiazem, Lisinopril  - Continue home Pravastatin and aspirin     Resp:   - Hypercapnia improving  - Currently on BIPAP. ABG with ph 7.31, PCO2 95, PHCO3 47.8 on admit  - ABG this AM: pH 7.351 pCO2 79.1 pO2 80 and HCO3 43.8  - Pt with previous smoking hx with COPD. On O2 at nursing home  - No concern for acute infectious pulm process at this time however will obtain CXR  - CXR negative for infectious etiology, concerning for CHF pattern  -  Continue Duonebs, home breo   - Will continue to monitor ABGs  - Pulm consulted, f/u rec's     Heme/ID:  - WBC 12.47. Pt with recent UTI 3/7 day ciprofloxacin. Will continue  - WBC 10.55 this AM, vitals stable  - UA with trace leukocytes. urine cx pending     Renal:  - Cr 1.0 at baseline  - BUN/Cr 43/0.9  - Pratt cath in use     Endo:  - Continue ome Synthroid 100 mcg. Will hold home Metformin and Glimiperide  - TSH 1.261, T4 0.74  - F/U HbA1c. Hba1c 8.7% (11/2015)  - Accuchecks q4     FEN/GI:  - Diet NPO  - Hypomagnesemia will replete     PPx: Lovenox and Protonix     Dispo: Monitor ABG and wean off of BiPAP. F/u pulm rec's    Naima Avalos MD  [facility]  06/26/2017 9:20 AM

## 2017-06-26 NOTE — ED NOTES
Pt placed on BIPAP with settings as charted, pt tolerating well and understands and agrees with care.
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
## 2017-06-26 NOTE — PLAN OF CARE
Problem: Patient Care Overview  Goal: Plan of Care Review  Outcome: Ongoing (interventions implemented as appropriate)  Pt on kBiPap with oxygen in no apparent distress.  Breathing tx. Given with ok pt. Effort.  Will cont. To monitor.

## 2017-06-26 NOTE — PHYSICIAN QUERY
PT Name: Michelle Godfrey  MR #: [id number]     Physician Query Form - Diagnosis Clarification      CDS/: Rachel Ochoa               Contact information: [phone]  This form is a permanent document in the medical record.     Query Date: June 26, 2017    By submitting this query, we are merely seeking further clarification of documentation.  Please utilize your independent clinical judgment when addressing the question(s) below.     The medical record contains the following:      Findings Supporting Clinical Information Location in Medical Record    Hypoglycemia versus acute respiratory failure        Currently on BIPAP. ABG with ph 7.31, PCO2 95, PHCO3 47.8 on admit  - ABG this AM: pH 7.351 pCO2 79.1 pO2 80 and HCO3 43.8  - Pt with previous smoking hx with COPD. On O2 at nursing home H&P      PN 6/26     Please clarify if the acute on chronic respiratory failure diagnosis has been:    [x  ] Ruled In  [  ] Ruled In, Now Resolved  [  ] Resolved Prior to My Assessment  [  ] Ruled Out  [  ] Clinically insignificant  [  ] Clinically undetermined  [  ] Other/Clarification of findings (please specify)_______________________________    Please document in your progress notes daily for the duration of treatment, until resolved, and include in your discharge summary.
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
## 2017-06-26 NOTE — ED NOTES
Spoke to pt's nurse at [facility] and gave report on pt's disposition and the fact we have been unable to get in touch with her family to let them know.

## 2017-06-26 NOTE — PROGRESS NOTES
Pt alert and oriented upon admit assessment. Went to give medications ordered, pt found very lethargic and unable to keep eyes open to follow commands. This is how pt was described by previous nurse at [place]. MD aware of change in pt. All vital signs stable.

## 2017-06-26 NOTE — PLAN OF CARE
TN rounded, patient is incoherent at this time, plan is to return to [facility] once ready for DC.     06/26/17 1039   Discharge Assessment   Assessment Type Discharge Planning Assessment   Assessment information obtained from? Medical Record   Expected Length of Stay (days) 2   Communicated expected length of stay with patient/caregiver yes   Prior to hospitilization cognitive status: Unable to Assess   Prior to hospitalization functional status: Needs Assistance   Current cognitive status: Unable to Assess   Current Functional Status: Needs Assistance   Arrived From assisted care facility   Lives With facility resident   Able to Return to Prior Arrangements yes   Is patient able to care for self after discharge? Unable to determine at this time (comments)   How many people do you have in your home that can help with your care after discharge? other (see comments)   Who are your caregiver(s) and their phone number(s)? Kathie Cho Sister [phone]    Patient's perception of discharge disposition assisted care facility   Readmission Within The Last 30 Days no previous admission in last 30 days   Patient currently being followed by outpatient case management? No   Patient currently receives home health services? No   Does the patient currently use HME? Yes   Patient currently receives private duty nursing? No   Patient currently receives any other outside agency services? No   Equipment Currently Used at Home (per nursing home)   Do you have any problems affording any of your prescribed medications? No   Do you have any financial concerns preventing you from receiving the healthcare you need? No   Does the patient have transportation to healthcare appointments? Yes   Transportation Available Medicaid transportation   On Dialysis? No   Discharge Plan A Return to nursing home

## 2017-06-26 NOTE — CLINICAL REVIEW
Results for SILVESTRE GONZALEZ (MRN [id number]) as of 6/26/2017 04:24   Ref. Range 6/26/2017 04:03 6/26/2017 04:04   CULTURE, URINE Unknown  Rpt   POC PH Latest Ref Range: 7.35 - 7.45  7.309 (L)    POC PCO2 Latest Ref Range: 35 - 45 mmHg 93.5 (HH)    POC PO2 Latest Ref Range: 80 - 100 mmHg 103 (H)    POC BE Latest Ref Range: -2 to 2 mmol/L 21    POC HCO3 Latest Ref Range: 24 - 28 mmol/L 46.9 (H)    POC SATURATED O2 Latest Ref Range: 95 - 100 % 97    POC TCO2 Latest Ref Range: 23 - 27 mmol/L 50 (H)    FiO2 Unknown 40    Sample Unknown ARTERIAL    DelSys Unknown CPAP/BiPAP    Allens Test Unknown Pass    Site Unknown RR    Mode Unknown BiPAP    EP Unknown 5    IP Unknown 15    Rate Unknown 4    Sp02 Unknown 100    Spont Rate Unknown 6    ABG reported to DR. Rios at 0420, via phone call.

## 2017-06-26 NOTE — PHYSICIAN QUERY
PT Name: Michelle Godfrey  MR #: [id number]     Physician Query Form - Documentation Clarification      CDS/: Rachel Ochoa               Contact information: [phone]  This form is a permanent document in the medical record.     Query Date: June 26, 2017    By submitting this query, we are merely seeking further clarification of documentation. Please utilize your independent clinical judgment when addressing the question(s) below.    The Medical record reflects the following:    Supporting Clinical Findings Location in Medical Record     BMI= 49    Physical Exam:  Physical Exam   Constitutional: She appears well-developed.   Morbidly obese      Anthropometrics 6/26    H&P 6/26                                                                                      Doctor, Please specify diagnosis or diagnoses associated with above clinical findings.    Provider Use Only        [x ] Morbid Obesity  [  ] Overweight   [  ] Clinically unable to determine  [  ] Other explanations with details_______________________________________                                                                                                                         [  ] Clinically undetermined          PT Name: Michelle Godfrey  MR #: [id number]     Physician Query Form - Documentation Clarification      CDS/: Rachel Ochoa               Contact information:    This form is a permanent document in the medical record.     Query Date: June 26, 2017    By submitting this query, we are merely seeking further clarification of documentation. Please utilize your independent clinical judgment when addressing the question(s) below.    The Medical record reflects the following:    Supporting Clinical Findings Location in Medical Record                                                                                                Doctor, Please specify diagnosis or diagnoses associated with above clinical findings.    Provider Use  Only                                                                                                                               [  ] Clinically undetermined

## 2017-06-27 PROBLEM — J42 CHRONIC BRONCHITIS: Status: ACTIVE | Noted: 2017-06-27

## 2017-06-27 LAB
ALBUMIN SERPL BCP-MCNC: 3 G/DL
ALLENS TEST: ABNORMAL
ALP SERPL-CCNC: 63 U/L
ALT SERPL W/O P-5'-P-CCNC: 9 U/L
ANION GAP SERPL CALC-SCNC: 7 MMOL/L
ANISOCYTOSIS BLD QL SMEAR: SLIGHT
AST SERPL-CCNC: 13 U/L
BACTERIA UR CULT: NO GROWTH
BASOPHILS NFR BLD: 0 %
BILIRUB SERPL-MCNC: 0.4 MG/DL
BUN SERPL-MCNC: 24 MG/DL
CALCIUM SERPL-MCNC: 10.2 MG/DL
CHLORIDE SERPL-SCNC: 101 MMOL/L
CO2 SERPL-SCNC: 34 MMOL/L
CREAT SERPL-MCNC: 0.8 MG/DL
DELSYS: ABNORMAL
DIASTOLIC DYSFUNCTION: NO
DIFFERENTIAL METHOD: ABNORMAL
EOSINOPHIL NFR BLD: 1 %
EP: 5
ERYTHROCYTE [DISTWIDTH] IN BLOOD BY AUTOMATED COUNT: 15.8 %
EST. GFR  (AFRICAN AMERICAN): >60 ML/MIN/1.73 M^2
EST. GFR  (NON AFRICAN AMERICAN): >60 ML/MIN/1.73 M^2
FIO2: 30
GLUCOSE SERPL-MCNC: 138 MG/DL
HCO3 UR-SCNC: 37.9 MMOL/L (ref 24–28)
HCT VFR BLD AUTO: 37.2 %
HGB BLD-MCNC: 11.5 G/DL
HYPOCHROMIA BLD QL SMEAR: ABNORMAL
IP: 15
LYMPHOCYTES NFR BLD: 55 %
MAGNESIUM SERPL-MCNC: 1.7 MG/DL
MCH RBC QN AUTO: 29.3 PG
MCHC RBC AUTO-ENTMCNC: 30.9 %
MCV RBC AUTO: 95 FL
MITRAL VALVE MOBILITY: NORMAL
MODE: ABNORMAL
MONOCYTES NFR BLD: 10 %
NEUTROPHILS NFR BLD: 34 %
PCO2 BLDA: 62.2 MMHG (ref 35–45)
PH SMN: 7.39 [PH] (ref 7.35–7.45)
PHOSPHATE SERPL-MCNC: 2.9 MG/DL
PLATELET # BLD AUTO: 253 K/UL
PLATELET BLD QL SMEAR: ABNORMAL
PMV BLD AUTO: 13.4 FL
PO2 BLDA: 86 MMHG (ref 80–100)
POC BE: 13 MMOL/L
POC SATURATED O2: 96 % (ref 95–100)
POC TCO2: 40 MMOL/L (ref 23–27)
POCT GLUCOSE: 126 MG/DL (ref 70–110)
POCT GLUCOSE: 173 MG/DL (ref 70–110)
POCT GLUCOSE: 214 MG/DL (ref 70–110)
POCT GLUCOSE: 239 MG/DL (ref 70–110)
POCT GLUCOSE: 244 MG/DL (ref 70–110)
POTASSIUM SERPL-SCNC: 4.3 MMOL/L
PROT SERPL-MCNC: 6.7 G/DL
RBC # BLD AUTO: 3.93 M/UL
RETIRED EF AND QEF - SEE NOTES: 65 (ref 55–65)
SAMPLE: ABNORMAL
SITE: ABNORMAL
SODIUM SERPL-SCNC: 142 MMOL/L
STOMATOCYTES BLD QL SMEAR: PRESENT
TARGETS BLD QL SMEAR: ABNORMAL
WBC # BLD AUTO: 12.17 K/UL

## 2017-06-27 PROCEDURE — 85027 COMPLETE CBC AUTOMATED: CPT

## 2017-06-27 PROCEDURE — 25000242 PHARM REV CODE 250 ALT 637 W/ HCPCS: Performed by: FAMILY MEDICINE

## 2017-06-27 PROCEDURE — 97162 PT EVAL MOD COMPLEX 30 MIN: CPT

## 2017-06-27 PROCEDURE — 63600175 PHARM REV CODE 636 W HCPCS: Performed by: FAMILY MEDICINE

## 2017-06-27 PROCEDURE — 95819 EEG AWAKE AND ASLEEP: CPT | Mod: 26,,, | Performed by: PSYCHIATRY & NEUROLOGY

## 2017-06-27 PROCEDURE — 25000003 PHARM REV CODE 250: Performed by: FAMILY MEDICINE

## 2017-06-27 PROCEDURE — 97530 THERAPEUTIC ACTIVITIES: CPT

## 2017-06-27 PROCEDURE — 84100 ASSAY OF PHOSPHORUS: CPT

## 2017-06-27 PROCEDURE — 83036 HEMOGLOBIN GLYCOSYLATED A1C: CPT

## 2017-06-27 PROCEDURE — 83735 ASSAY OF MAGNESIUM: CPT

## 2017-06-27 PROCEDURE — 80053 COMPREHEN METABOLIC PANEL: CPT

## 2017-06-27 PROCEDURE — 94640 AIRWAY INHALATION TREATMENT: CPT

## 2017-06-27 PROCEDURE — 99900035 HC TECH TIME PER 15 MIN (STAT)

## 2017-06-27 PROCEDURE — 36600 WITHDRAWAL OF ARTERIAL BLOOD: CPT

## 2017-06-27 PROCEDURE — 36415 COLL VENOUS BLD VENIPUNCTURE: CPT

## 2017-06-27 PROCEDURE — 21400001 HC TELEMETRY ROOM

## 2017-06-27 PROCEDURE — 94660 CPAP INITIATION&MGMT: CPT

## 2017-06-27 PROCEDURE — 85007 BL SMEAR W/DIFF WBC COUNT: CPT

## 2017-06-27 PROCEDURE — 82803 BLOOD GASES ANY COMBINATION: CPT

## 2017-06-27 PROCEDURE — 27000221 HC OXYGEN, UP TO 24 HOURS

## 2017-06-27 PROCEDURE — 94761 N-INVAS EAR/PLS OXIMETRY MLT: CPT

## 2017-06-27 PROCEDURE — 95816 EEG AWAKE AND DROWSY: CPT

## 2017-06-27 RX ORDER — INSULIN ASPART 100 [IU]/ML
1-10 INJECTION, SOLUTION INTRAVENOUS; SUBCUTANEOUS
Status: DISCONTINUED | OUTPATIENT
Start: 2017-06-27 | End: 2017-06-28 | Stop reason: HOSPADM

## 2017-06-27 RX ORDER — GLUCAGON 1 MG
1 KIT INJECTION
Status: DISCONTINUED | OUTPATIENT
Start: 2017-06-27 | End: 2017-06-28 | Stop reason: HOSPADM

## 2017-06-27 RX ORDER — LISINOPRIL 2.5 MG/1
2.5 TABLET ORAL DAILY
Status: DISCONTINUED | OUTPATIENT
Start: 2017-06-27 | End: 2017-06-28 | Stop reason: HOSPADM

## 2017-06-27 RX ORDER — IBUPROFEN 200 MG
16 TABLET ORAL
Status: DISCONTINUED | OUTPATIENT
Start: 2017-06-27 | End: 2017-06-28 | Stop reason: HOSPADM

## 2017-06-27 RX ORDER — ALPRAZOLAM 0.25 MG/1
0.5 TABLET ORAL 3 TIMES DAILY PRN
Status: DISCONTINUED | OUTPATIENT
Start: 2017-06-27 | End: 2017-06-28 | Stop reason: HOSPADM

## 2017-06-27 RX ORDER — IBUPROFEN 200 MG
24 TABLET ORAL
Status: DISCONTINUED | OUTPATIENT
Start: 2017-06-27 | End: 2017-06-28 | Stop reason: HOSPADM

## 2017-06-27 RX ORDER — HYDROCODONE BITARTRATE AND ACETAMINOPHEN 5; 325 MG/1; MG/1
1 TABLET ORAL EVERY 6 HOURS PRN
Status: DISCONTINUED | OUTPATIENT
Start: 2017-06-27 | End: 2017-06-28 | Stop reason: HOSPADM

## 2017-06-27 RX ADMIN — DIVALPROEX SODIUM 1500 MG: 500 TABLET, EXTENDED RELEASE ORAL at 09:06

## 2017-06-27 RX ADMIN — INSULIN ASPART 4 UNITS: 100 INJECTION, SOLUTION INTRAVENOUS; SUBCUTANEOUS at 01:06

## 2017-06-27 RX ADMIN — HYDROCODONE BITARTRATE AND ACETAMINOPHEN 1 TABLET: 5; 325 TABLET ORAL at 05:06

## 2017-06-27 RX ADMIN — HYDROCODONE BITARTRATE AND ACETAMINOPHEN 1 TABLET: 5; 325 TABLET ORAL at 11:06

## 2017-06-27 RX ADMIN — GABAPENTIN 300 MG: 300 CAPSULE ORAL at 01:06

## 2017-06-27 RX ADMIN — PRAVASTATIN SODIUM 20 MG: 20 TABLET ORAL at 09:06

## 2017-06-27 RX ADMIN — SODIUM CHLORIDE, PRESERVATIVE FREE 3 ML: 5 INJECTION INTRAVENOUS at 09:06

## 2017-06-27 RX ADMIN — IPRATROPIUM BROMIDE AND ALBUTEROL SULFATE 3 ML: .5; 3 SOLUTION RESPIRATORY (INHALATION) at 03:06

## 2017-06-27 RX ADMIN — ENOXAPARIN SODIUM 40 MG: 100 INJECTION SUBCUTANEOUS at 05:06

## 2017-06-27 RX ADMIN — FLUTICASONE FUROATE AND VILANTEROL TRIFENATATE 1 PUFF: 100; 25 POWDER RESPIRATORY (INHALATION) at 09:06

## 2017-06-27 RX ADMIN — SODIUM CHLORIDE, PRESERVATIVE FREE 3 ML: 5 INJECTION INTRAVENOUS at 06:06

## 2017-06-27 RX ADMIN — IPRATROPIUM BROMIDE AND ALBUTEROL SULFATE 3 ML: .5; 3 SOLUTION RESPIRATORY (INHALATION) at 12:06

## 2017-06-27 RX ADMIN — PANTOPRAZOLE SODIUM 40 MG: 40 TABLET, DELAYED RELEASE ORAL at 09:06

## 2017-06-27 RX ADMIN — GABAPENTIN 300 MG: 300 CAPSULE ORAL at 06:06

## 2017-06-27 RX ADMIN — IPRATROPIUM BROMIDE AND ALBUTEROL SULFATE 3 ML: .5; 3 SOLUTION RESPIRATORY (INHALATION) at 04:06

## 2017-06-27 RX ADMIN — IPRATROPIUM BROMIDE AND ALBUTEROL SULFATE 3 ML: .5; 3 SOLUTION RESPIRATORY (INHALATION) at 09:06

## 2017-06-27 RX ADMIN — LISINOPRIL 2.5 MG: 2.5 TABLET ORAL at 01:06

## 2017-06-27 RX ADMIN — ALPRAZOLAM 0.5 MG: 0.25 TABLET ORAL at 01:06

## 2017-06-27 RX ADMIN — IPRATROPIUM BROMIDE AND ALBUTEROL SULFATE 3 ML: .5; 3 SOLUTION RESPIRATORY (INHALATION) at 08:06

## 2017-06-27 RX ADMIN — GABAPENTIN 300 MG: 300 CAPSULE ORAL at 09:06

## 2017-06-27 RX ADMIN — LEVOTHYROXINE SODIUM 100 MCG: 100 TABLET ORAL at 06:06

## 2017-06-27 RX ADMIN — ASPIRIN 81 MG: 81 TABLET, COATED ORAL at 09:06

## 2017-06-27 RX ADMIN — SODIUM CHLORIDE, PRESERVATIVE FREE 3 ML: 5 INJECTION INTRAVENOUS at 01:06

## 2017-06-27 RX ADMIN — INSULIN ASPART 2 UNITS: 100 INJECTION, SOLUTION INTRAVENOUS; SUBCUTANEOUS at 09:06

## 2017-06-27 NOTE — PLAN OF CARE
Problem: Patient Care Overview  Goal: Plan of Care Review  Pt received on nasal cannula at 2  lpm. SPO2  95 %.  Pt in no apparent respiratory distress.  Will continue to monitor.

## 2017-06-27 NOTE — PROGRESS NOTES
"Progress Note  [facility]    Attending: Jessica  Fellow: Sharath  Admit Date: 6/26/2017  Today's Date: 06/27/2017    SUBJECTIVE:     Follow-up For:  Acute Hypercapnic Respiratory failure.     No issues overnight. Complains of pain and requesting pain meds all night. UOP normalized.    All medications reviewed    OBJECTIVE:     Vital Signs Trends/Hx Reviewed  Ventilator settings:   Physical Exam:  Physical Exam   Constitutional: She is oriented to person, place, and time. She appears distressed.   HENT:   Head: Normocephalic and atraumatic.   Cardiovascular: Normal rate and regular rhythm.    Pulmonary/Chest: Effort normal and breath sounds normal.   Abdominal: Soft. Bowel sounds are normal.   Neurological: She is alert and oriented to person, place, and time.   Skin: Skin is warm and dry. She is not diaphoretic.     Laboratory:  CBC:   Recent Labs  Lab 06/27/17  0333   WBC 12.17   RBC 3.93*   HGB 11.5*   HCT 37.2      MCV 95   MCH 29.3   MCHC 30.9*     CMP:   Recent Labs  Lab 06/27/17  0333   *   CALCIUM 10.2   ALBUMIN 3.0*   PROT 6.7      K 4.3   CO2 34*      BUN 24*   CREATININE 0.8   ALKPHOS 63   ALT 9*   AST 13   BILITOT 0.4       ASSESSMENT/PLAN:     Ms. Godfrey is a 55yo female with a PMHx of Bipolar, Anemia, SAMANTHA on CPAP, HFpEF, hypothyroid, HTN, DM, COPD admitted with hypercapnic respiratory failure. Pulmonary consulted for the same. The circumstances of her presentation to [facility] are unclear, but she was transported from the NH where she lives for "low blood sugar". In ED, pt noted to by hypoglycemic in the 60s, lethargic. ABG showed hypercapnic respiratory failure. Pt transferred for higher level of care. After reviewing patient's home meds, she takes quite a bit of benzos and opiates. She had a nearly identical presentation to [facility] in November 2015.    Neurology:   Narcotic Encephalopathy  · AMS reversed s/p narcan x2 " yesterday  · Would  on alternate pain control methods  Bipolar Disorder  · Continue mood stabilizers    Cardiovascular:   HTN  HFpEF  · TTE from 11/2015 shows EF of 65% with diastolic dysfunction  · TTE showed preserved EF    Pulmonary:   Acute on Chronic Hypercapnic Respiratory Failure  · Improved, pH normalized, pCO2 chronically elevated  · SAMANTHA and probable OHS  · Avoid narcotics, pt's OHS puts her at increased risk for respiratory depression  COPD  · Continue duonebs PRN  · Resume home medications    Renal:   No issues.    ID/Hematology:   UTI  · Cultures were negative, abx stopped    Endocrinology:  DM  Hypothyroidism    FEN/GI:   PO    Prophylaxis:   Protonix  Lovenox    Camilo Early MD  Fellow, [facility]  Cell [phone]

## 2017-06-27 NOTE — NURSING TRANSFER
Nursing Transfer Note      6/27/2017     Transfer To: [id number]    Transfer via bed    Transfer with  O2, cardiac monitoring    Transported by transport, RN    Medicines sent: yes    Chart send with patient: Yes    Notified: sister    Patient reassessed at: per 4th floor RN    Upon arrival to floor: cardiac monitor applied, patient oriented to room, call bell in reach and bed in lowest position

## 2017-06-27 NOTE — PLAN OF CARE
06/27/17 1520   Discharge Assessment   Equipment Currently Used at Home wheelchair;rollator;shower chair;bedside commode   TN spoke with daughter and confirmed her home equipment.  She stated that she is about to start a job and would not be able to stay 24-7 with the patient however during the day she has a niece that would be able to stay with her during the times that daugter would not be home but that it would depend on how the patient is doing health-wise.  TN encouraged her to stay actively engaged with the patient and gave her the phone number to the patient's room and also to the TN.  Rajiv Romo RN  Transitional Navigator/Case Management  [phone]
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
## 2017-06-27 NOTE — PROGRESS NOTES
TN got two more choices for NH placement 1. [facility] and 2. [facility]. TN will notify [facility] of these choices.  Rajiv Romo RN  Transitional Navigator/Case Management  [phone]

## 2017-06-27 NOTE — PROGRESS NOTES
The Sw faxed the pt's info to [facility] in [place] via [employer].     3:50pm The Sw faxed the pt's info to [facility] via [employer].
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
## 2017-06-27 NOTE — PROGRESS NOTES
Progress Note  [facility]  Admit Date: 6/26/2017   LOS: 1 day   SUBJECTIVE:   Follow-up For: Hypercapnia    Patient seen and examined this AM. No acute patient overnight. Patient awake and alert this AM. She reports lower back pain but otherwise denies any complains.    Review of Systems   Constitutional: Negative for chills and fever.   Respiratory: Negative for cough and shortness of breath.    Cardiovascular: Negative for chest pain.   Gastrointestinal: Negative for abdominal pain, nausea and vomiting.   Musculoskeletal: Positive for back pain.   Neurological: Negative for dizziness.     OBJECTIVE:   Vital Signs (Most Recent)  Temp: 98.2 °F (36.8 °C) (06/27/17 0701)  Pulse: 92 (06/27/17 1000)  Resp: (!) 28 (06/27/17 1000)  BP: (!) 141/63 (06/27/17 1000)  SpO2: 96 % (06/27/17 1000)    I & O (Last 24H):    Intake/Output Summary (Last 24 hours) at 06/27/17 1029  Last data filed at 06/27/17 0506   Gross per 24 hour   Intake              550 ml   Output             4860 ml   Net            -4310 ml     Wt Readings from Last 3 Encounters:   06/26/17 (!) 138.1 kg (304 lb 7.3 oz)   06/25/17 122.5 kg (270 lb)   08/22/16 129.3 kg (285 lb)       Current Diet Order   Procedures    Diet Cardiac Diabetic 2000     Order Specific Question:   Additional restrictions:     Answer:   Diabetic 2000        Physical Exam   Constitutional: No distress.   HENT:   Head: Normocephalic and atraumatic.   BiPAP mask in place   Cardiovascular: Normal rate, regular rhythm and normal heart sounds.    No murmur heard.  Pulmonary/Chest: Effort normal and breath sounds normal. She has no wheezes. She has no rales.   Abdominal: Soft. Bowel sounds are normal. She exhibits no distension. There is no tenderness.   Musculoskeletal: She exhibits no edema or tenderness.   Neurological:   Awake and alert   Skin: She is not diaphoretic.     Laboratory Data:  CBC    Recent Labs  Lab 06/25/17  1908 06/26/17  0415 06/27/17  0333   WBC 12.47 10.55  12.17   RBC 3.59* 3.50* 3.93*   HGB 10.5* 10.2* 11.5*   HCT 35.6* 34.0* 37.2    199 253   MCV 99* 97 95   MCH 29.2 29.1 29.3   MCHC 29.5* 30.0* 30.9*     CMP    Recent Labs  Lab 06/26/17  0415 06/26/17  1354 06/27/17  0333   CALCIUM 9.5 10.1 10.2   PROT 6.1 6.9 6.7    145 142   K 4.7 4.7 4.3   CO2 42* 39* 34*   CL 95 98 101   BUN 43* 32* 24*   CREATININE 0.9 0.9 0.8   ALKPHOS 57 67 63   ALT 10 11 9*   AST 14 14 13   BILITOT 0.3 0.3 0.4     POCT-Glucose  POCT Glucose   Date Value Ref Range Status   06/27/2017 126 (H) 70 - 110 mg/dL Final   06/26/2017 172 (H) 70 - 110 mg/dL Final   06/26/2017 156 (H) 70 - 110 mg/dL Final   06/26/2017 131 (H) 70 - 110 mg/dL Final   06/26/2017 89 70 - 110 mg/dL Final   06/26/2017 86 70 - 110 mg/dL Final   06/26/2017 80 70 - 110 mg/dL Final   06/26/2017 77 70 - 110 mg/dL Final   06/26/2017 74 70 - 110 mg/dL Final   06/25/2017 125 (H) 70 - 110 mg/dL Final   06/25/2017 74 70 - 110 mg/dL Final   06/25/2017 98 70 - 110 mg/dL Final     UA  No results for input(s): COLORU, CLARITYU, SPECGRAV, PHUR, PROTEINUA, GLUCOSEU, BLOODU, WBCU, RBCU, BACTERIA, MUCUS in the last 24 hours.    Invalid input(s):  BILIRUBINCON  MICRO  Microbiology Results (last 7 days)     Procedure Component Value Units Date/Time    Urine culture [[id number]] Collected:  06/26/17 0404    Order Status:  Sent Specimen:  Urine from Urine, Catheterized Updated:  06/26/17 0636        CXR  1.  Cardiomegaly.  2.  Findings suspicious for early congestive heart failure.    ASSESSMENT/PLAN:   Michelle Godfrey is a 56 y.o. female with PMHx of COPD, DM2, HTN, Bipolar d/o, Hypercapnic respiratory failure, Hypothyroidism found to be hypoglycemic and hypercapnic now on BIPAP.     Plan: Admit to [facility]     Neuro:   - Mentation improved, received 2 doses of Narcan yesterday  - GCS 15  - CAM ICU negative  - CT scan negative for acute process. Utox positive for Benzos and Opiates (home Norco and Diazepam PRN)  - Will  hold home Baclofen, Lexapro, Diazepam, Norco, Depakote until mentation improved. Will need med reconciliation prior to d/c  - Per nurse, upon arrival to ICU, patient more talkative and AAOx3  - Valproic acid level subtherapeutic  - Continue Depakote 1500mg BID     CV:  - BP stable 100s-110s/50s-60s.  - /66 this AM   - Continue to hold home Diltiazem  - Resume Lisinopril  - Continue home Pravastatin and aspirin     Resp:   - Hypercapnia improved  - Off of BIPAP. ABG with ph 7.31, PCO2 95, PHCO3 47.8 on admit  - ABG this AM: pH 7.393 pCO2 62.2 pO2 86 and HCO3 37.9  - Pt with previous smoking hx with COPD. On O2 at nursing home  - No concern for acute infectious pulm process at this time however will obtain CXR  - CXR negative for infectious etiology, concerning for CHF pattern  - Continue Duonebs, home breo   - Will continue to monitor ABGs  - Pulm consulted, appreciate rec's     Heme/ID:  - WBC 12.47. Pt with recent UTI  - WBC 11.5 this AM, vitals stable  - UA with trace leukocytes. urine cx pending     Renal:  - Cr 1.0 at baseline  - BUN/Cr 24/0.8 this AM  - Pratt cath in place     Endo:  - Continue ome Synthroid 100 mcg. Will hold home Metformin and Glimiperide  - TSH 1.261, T4 0.74  - F/U HbA1c. Hba1c 8.7% (11/2015)  - Accuchecks q4     FEN/GI:  - Cardiac and diabetic diet     PPx: Lovenox and Protonix     Dispo: Monitor respiratory status, transfer to the floor    Naima Avalos MD  [facility]  2  06/27/2017 12:15 PM

## 2017-06-27 NOTE — PLAN OF CARE
Problem: Physical Therapy Goal  Goal: Physical Therapy Goal  Goals to be met by: 17     Patient will increase functional independence with mobility by performin. Supine to sit with S  2. Sit to supine with S  3. Sit to stand transfer with Contact Guard Assistance  3. Gait  x 50 feet with Contact Guard Assistance using Rolling Walker.     Outcome: Ongoing (interventions implemented as appropriate)  PT eval.  Pt c/o back and neck pain.  Waiting for nurse to bring pain meds.  Pt agreed to participate.  Sat and stood at EOB.  Steps to HOB to reposition as pt had been very low in bed.  amb deferred this visit.  Pt in NH per CM note but pt stated she went there 2 months ago to get therapy with goal to go back home.  [facility]  [facility]  [facility]

## 2017-06-27 NOTE — PROGRESS NOTES
Pt asking for pain medications. MD aware. Pt educated on the use of pain medications and the side effects.

## 2017-06-27 NOTE — PROGRESS NOTES
The Sw received a call from Luisa at [facility] stating she received the info and she will reach out to the pt's daughter and sister to inquire some info from them. The Sw informed Rajiv(TN)of this info. Luisa will call the Sw back to inform her of the determination.

## 2017-06-28 VITALS
HEART RATE: 79 BPM | RESPIRATION RATE: 18 BRPM | WEIGHT: 293 LBS | SYSTOLIC BLOOD PRESSURE: 147 MMHG | HEIGHT: 66 IN | TEMPERATURE: 98 F | DIASTOLIC BLOOD PRESSURE: 80 MMHG | OXYGEN SATURATION: 94 % | BODY MASS INDEX: 47.09 KG/M2

## 2017-06-28 LAB
ALBUMIN SERPL BCP-MCNC: 3.2 G/DL
ALP SERPL-CCNC: 60 U/L
ALT SERPL W/O P-5'-P-CCNC: 8 U/L
ANION GAP SERPL CALC-SCNC: 10 MMOL/L
ANISOCYTOSIS BLD QL SMEAR: SLIGHT
AST SERPL-CCNC: 13 U/L
BASOPHILS # BLD AUTO: ABNORMAL K/UL
BASOPHILS NFR BLD: 0 %
BILIRUB SERPL-MCNC: 0.5 MG/DL
BUN SERPL-MCNC: 30 MG/DL
CALCIUM SERPL-MCNC: 10 MG/DL
CHLORIDE SERPL-SCNC: 101 MMOL/L
CO2 SERPL-SCNC: 28 MMOL/L
CREAT SERPL-MCNC: 0.9 MG/DL
DIFFERENTIAL METHOD: ABNORMAL
EOSINOPHIL # BLD AUTO: ABNORMAL K/UL
EOSINOPHIL NFR BLD: 1 %
ERYTHROCYTE [DISTWIDTH] IN BLOOD BY AUTOMATED COUNT: 15.8 %
EST. GFR  (AFRICAN AMERICAN): >60 ML/MIN/1.73 M^2
EST. GFR  (NON AFRICAN AMERICAN): >60 ML/MIN/1.73 M^2
ESTIMATED AVG GLUCOSE: 143 MG/DL
GLUCOSE SERPL-MCNC: 171 MG/DL
HBA1C MFR BLD HPLC: 6.6 %
HCT VFR BLD AUTO: 37.2 %
HGB BLD-MCNC: 11.5 G/DL
LYMPHOCYTES # BLD AUTO: ABNORMAL K/UL
LYMPHOCYTES NFR BLD: 62 %
MAGNESIUM SERPL-MCNC: 1.4 MG/DL
MCH RBC QN AUTO: 28.9 PG
MCHC RBC AUTO-ENTMCNC: 30.9 %
MCV RBC AUTO: 94 FL
MONOCYTES # BLD AUTO: ABNORMAL K/UL
MONOCYTES NFR BLD: 7 %
NEUTROPHILS NFR BLD: 29 %
NEUTS BAND NFR BLD MANUAL: 1 %
PHOSPHATE SERPL-MCNC: 3.8 MG/DL
PLATELET # BLD AUTO: 216 K/UL
PLATELET BLD QL SMEAR: ABNORMAL
PMV BLD AUTO: 13 FL
POCT GLUCOSE: 186 MG/DL (ref 70–110)
POCT GLUCOSE: 261 MG/DL (ref 70–110)
POIKILOCYTOSIS BLD QL SMEAR: SLIGHT
POLYCHROMASIA BLD QL SMEAR: ABNORMAL
POTASSIUM SERPL-SCNC: 4.7 MMOL/L
PROT SERPL-MCNC: 6.9 G/DL
RBC # BLD AUTO: 3.98 M/UL
SODIUM SERPL-SCNC: 139 MMOL/L
WBC # BLD AUTO: 11.12 K/UL

## 2017-06-28 PROCEDURE — 94640 AIRWAY INHALATION TREATMENT: CPT

## 2017-06-28 PROCEDURE — 99900035 HC TECH TIME PER 15 MIN (STAT)

## 2017-06-28 PROCEDURE — 80053 COMPREHEN METABOLIC PANEL: CPT

## 2017-06-28 PROCEDURE — 84100 ASSAY OF PHOSPHORUS: CPT

## 2017-06-28 PROCEDURE — 97530 THERAPEUTIC ACTIVITIES: CPT

## 2017-06-28 PROCEDURE — 25000242 PHARM REV CODE 250 ALT 637 W/ HCPCS: Performed by: FAMILY MEDICINE

## 2017-06-28 PROCEDURE — 97110 THERAPEUTIC EXERCISES: CPT

## 2017-06-28 PROCEDURE — 25000003 PHARM REV CODE 250: Performed by: FAMILY MEDICINE

## 2017-06-28 PROCEDURE — 85027 COMPLETE CBC AUTOMATED: CPT

## 2017-06-28 PROCEDURE — 85007 BL SMEAR W/DIFF WBC COUNT: CPT

## 2017-06-28 PROCEDURE — 97166 OT EVAL MOD COMPLEX 45 MIN: CPT

## 2017-06-28 PROCEDURE — 36415 COLL VENOUS BLD VENIPUNCTURE: CPT

## 2017-06-28 PROCEDURE — 94660 CPAP INITIATION&MGMT: CPT

## 2017-06-28 PROCEDURE — 83735 ASSAY OF MAGNESIUM: CPT

## 2017-06-28 PROCEDURE — 27000221 HC OXYGEN, UP TO 24 HOURS

## 2017-06-28 PROCEDURE — 94761 N-INVAS EAR/PLS OXIMETRY MLT: CPT

## 2017-06-28 RX ORDER — FUROSEMIDE 20 MG/1
10 TABLET ORAL 2 TIMES DAILY
Qty: 30 TABLET | Refills: 0 | Status: ON HOLD
Start: 2017-06-28 | End: 2018-10-17 | Stop reason: HOSPADM

## 2017-06-28 RX ORDER — ALPRAZOLAM 0.5 MG/1
0.5 TABLET ORAL 3 TIMES DAILY PRN
Qty: 30 TABLET | Refills: 0 | Status: ON HOLD | OUTPATIENT
Start: 2017-06-28 | End: 2017-10-03 | Stop reason: HOSPADM

## 2017-06-28 RX ORDER — DIVALPROEX SODIUM 500 MG/1
1500 TABLET, FILM COATED, EXTENDED RELEASE ORAL 2 TIMES DAILY
Refills: 0
Start: 2017-06-28 | End: 2017-06-28

## 2017-06-28 RX ORDER — DIVALPROEX SODIUM 500 MG/1
1500 TABLET, FILM COATED, EXTENDED RELEASE ORAL 2 TIMES DAILY
Start: 2017-06-28 | End: 2020-03-03

## 2017-06-28 RX ORDER — HYDROCODONE BITARTRATE AND ACETAMINOPHEN 5; 325 MG/1; MG/1
1 TABLET ORAL EVERY 6 HOURS PRN
Qty: 30 TABLET | Refills: 0 | Status: SHIPPED | OUTPATIENT
Start: 2017-06-28 | End: 2018-04-23

## 2017-06-28 RX ADMIN — IPRATROPIUM BROMIDE AND ALBUTEROL SULFATE 3 ML: .5; 3 SOLUTION RESPIRATORY (INHALATION) at 12:06

## 2017-06-28 RX ADMIN — HYDROCODONE BITARTRATE AND ACETAMINOPHEN 1 TABLET: 5; 325 TABLET ORAL at 06:06

## 2017-06-28 RX ADMIN — HYDROCODONE BITARTRATE AND ACETAMINOPHEN 1 TABLET: 5; 325 TABLET ORAL at 12:06

## 2017-06-28 RX ADMIN — LEVOTHYROXINE SODIUM 100 MCG: 100 TABLET ORAL at 05:06

## 2017-06-28 RX ADMIN — DIVALPROEX SODIUM 1500 MG: 500 TABLET, EXTENDED RELEASE ORAL at 09:06

## 2017-06-28 RX ADMIN — GABAPENTIN 300 MG: 300 CAPSULE ORAL at 05:06

## 2017-06-28 RX ADMIN — ASPIRIN 81 MG: 81 TABLET, COATED ORAL at 09:06

## 2017-06-28 RX ADMIN — GABAPENTIN 300 MG: 300 CAPSULE ORAL at 01:06

## 2017-06-28 RX ADMIN — INSULIN ASPART 6 UNITS: 100 INJECTION, SOLUTION INTRAVENOUS; SUBCUTANEOUS at 11:06

## 2017-06-28 RX ADMIN — SODIUM CHLORIDE, PRESERVATIVE FREE 3 ML: 5 INJECTION INTRAVENOUS at 05:06

## 2017-06-28 RX ADMIN — IPRATROPIUM BROMIDE AND ALBUTEROL SULFATE 3 ML: .5; 3 SOLUTION RESPIRATORY (INHALATION) at 04:06

## 2017-06-28 RX ADMIN — LISINOPRIL 2.5 MG: 2.5 TABLET ORAL at 09:06

## 2017-06-28 RX ADMIN — FLUTICASONE FUROATE AND VILANTEROL TRIFENATATE 1 PUFF: 100; 25 POWDER RESPIRATORY (INHALATION) at 08:06

## 2017-06-28 RX ADMIN — IPRATROPIUM BROMIDE AND ALBUTEROL SULFATE 3 ML: .5; 3 SOLUTION RESPIRATORY (INHALATION) at 07:06

## 2017-06-28 NOTE — PLAN OF CARE
Report given to FRANCISCO JAVIER Weller at [facility].  Nurse verbalized understanding.  Awaiting transportation from [facility] to come and  the pt.  Will continue to monitor pt until transport gets here.

## 2017-06-28 NOTE — PLAN OF CARE
Problem: Occupational Therapy Goal  Goal: Occupational Therapy Goal  Outcome: Outcome(s) achieved Date Met: 06/28/17  Pt. Presents to OT with weakness, impaired standing balance limiting indep and safety; bed mobility SBA with HOB slightly elevated; sit<.stand initially CGA , second trial SBa to RW; bed <>BSc stand pivot t/f initially CGA with Rw; second trial , SBA with RW.  Toileting mi assist for hygiene  2/2 limited reach and BSc to narrow for pt to spread legs wider to reach rear from front like she does PLOF.  Pt. Ambulated with RW and CGA to sink; grooing standing at sink with SBA with B forearms resting on sink 2/2 back pain; pt ambulated to BSC on opposite side of the room with SBA with RW.  2LO2 NC all times; o SOB; slow, no LOB.  Pt. T/f to BS chair with SBA with RW.  Left up In chair with call light and safety instructions.  Nurse notified.  Pt. to bedischarged back to NH today.  Awaiting transport. DC OT at this time.  SNF recommended.

## 2017-06-28 NOTE — PLAN OF CARE
Transport does not have oxygen to take the pt back to [facility].  Pt cannot leave until facility supplies oxygen for the pt.  Awaiting [facility] to send/get oxygen so that we can release the pt to them.  CHARANJIT Vance notified.

## 2017-06-28 NOTE — PROGRESS NOTES
TN called [facility] to see if patient has been accepted as they are now medically ready, had to leave message for Nikki, awaiting call back.

## 2017-06-28 NOTE — PROGRESS NOTES
Progress Note  [facility]  Admit Date: 6/26/2017   LOS: 2 days   SUBJECTIVE:   Follow-up For: Hypercapnia    Patient seen and examined this AM. No acute patient overnight. Patient awake and alert this AM. She reports lower back pain but otherwise denies any complains.    Review of Systems   Constitutional: Negative for chills and fever.   Respiratory: Negative for cough and shortness of breath.    Cardiovascular: Negative for chest pain.   Gastrointestinal: Negative for abdominal pain, nausea and vomiting.   Musculoskeletal: Positive for back pain.   Neurological: Negative for dizziness.     OBJECTIVE:   Vital Signs (Most Recent)  Temp: 98.2 °F (36.8 °C) (06/28/17 0800)  Pulse: 72 (06/28/17 0803)  Resp: 20 (06/28/17 0803)  BP: (!) 147/80 (06/28/17 0800)  SpO2: 99 % (06/28/17 0803)    I & O (Last 24H):    Intake/Output Summary (Last 24 hours) at 06/28/17 0848  Last data filed at 06/28/17 0617   Gross per 24 hour   Intake             1385 ml   Output             1573 ml   Net             -188 ml     Wt Readings from Last 3 Encounters:   06/26/17 (!) 138.1 kg (304 lb 7.3 oz)   06/25/17 122.5 kg (270 lb)   08/22/16 129.3 kg (285 lb)       Current Diet Order   Procedures    Diet Cardiac Diabetic 2000     Order Specific Question:   Additional restrictions:     Answer:   Diabetic 2000        Physical Exam   Constitutional: No distress.   HENT:   Head: Normocephalic and atraumatic.   BiPAP mask in place   Cardiovascular: Normal rate, regular rhythm and normal heart sounds.    No murmur heard.  Pulmonary/Chest: Effort normal and breath sounds normal. She has no wheezes. She has no rales.   Abdominal: Soft. Bowel sounds are normal. She exhibits no distension. There is no tenderness.   Musculoskeletal: She exhibits no edema or tenderness.   Neurological:   Awake and alert   Skin: She is not diaphoretic.     Laboratory Data:  CBC    Recent Labs  Lab 06/26/17  0415 06/27/17  0333 06/28/17  0713   WBC 10.55 12.17 11.12    RBC 3.50* 3.93* 3.98*   HGB 10.2* 11.5* 11.5*   HCT 34.0* 37.2 37.2    253 216   MCV 97 95 94   MCH 29.1 29.3 28.9   MCHC 30.0* 30.9* 30.9*     CMP    Recent Labs  Lab 06/26/17  1354 06/27/17  0333 06/28/17  0713   CALCIUM 10.1 10.2 10.0   PROT 6.9 6.7 6.9    142 139   K 4.7 4.3 4.7   CO2 39* 34* 28   CL 98 101 101   BUN 32* 24* 30*   CREATININE 0.9 0.8 0.9   ALKPHOS 67 63 60   ALT 11 9* 8*   AST 14 13 13   BILITOT 0.3 0.4 0.5     POCT-Glucose  POCT Glucose   Date Value Ref Range Status   06/28/2017 186 (H) 70 - 110 mg/dL Final   06/27/2017 239 (H) 70 - 110 mg/dL Final   06/27/2017 214 (H) 70 - 110 mg/dL Final   06/27/2017 173 (H) 70 - 110 mg/dL Final   06/27/2017 244 (H) 70 - 110 mg/dL Final   06/27/2017 126 (H) 70 - 110 mg/dL Final   06/26/2017 172 (H) 70 - 110 mg/dL Final   06/26/2017 156 (H) 70 - 110 mg/dL Final   06/26/2017 131 (H) 70 - 110 mg/dL Final   06/26/2017 89 70 - 110 mg/dL Final   06/26/2017 86 70 - 110 mg/dL Final   06/26/2017 80 70 - 110 mg/dL Final   06/26/2017 77 70 - 110 mg/dL Final   06/26/2017 74 70 - 110 mg/dL Final   06/25/2017 125 (H) 70 - 110 mg/dL Final   06/25/2017 74 70 - 110 mg/dL Final   06/25/2017 98 70 - 110 mg/dL Final     UA  No results for input(s): COLORU, CLARITYU, SPECGRAV, PHUR, PROTEINUA, GLUCOSEU, BLOODU, WBCU, RBCU, BACTERIA, MUCUS in the last 24 hours.    Invalid input(s):  BILIRUBINCON  MICRO  Microbiology Results (last 7 days)     Procedure Component Value Units Date/Time    Urine culture [[id number]] Collected:  06/26/17 0404    Order Status:  Completed Specimen:  Urine from Urine, Catheterized Updated:  06/27/17 1319     Urine Culture, Routine No growth        CXR  1.  Cardiomegaly.  2.  Findings suspicious for early congestive heart failure.    ASSESSMENT/PLAN:   Michelle Godfrey is a 56 y.o. female with PMHx of COPD, DM2, HTN, Bipolar d/o, Hypercapnic respiratory failure, Hypothyroidism found to be hypoglycemic and hypercapnic now on BIPAP.     Neuro:    - Mentation improved, received 2 doses of Narcan upon admission  - GCS 15  - CAM ICU negative  - CT scan negative for acute process. Utox positive for Benzos and Opiates (home Norco and Diazepam PRN)  - Will hold home Baclofen, Lexapro, Diazepam, Norco, Depakote until mentation improved. Will need med reconciliation prior to d/c  - Per nurse, upon arrival to ICU, patient more talkative and AAOx3  - Valproic acid level subtherapeutic  - Continue Depakote 1500mg BID  - Pt was transferred to the floor yesterday and she has been stable, using bipap at night and 2L NC during the daytime, which is her baseline.   - EEG was performed, pending results.      CV:  - BP stable 100s-110s/50s-60s.  - /80 this AM   - Continue to hold home Diltiazem  - Resume Lisinopril  - Continue home Pravastatin and aspirin     Resp:   - Hypercapnia improved  - Off of BIPAP. ABG with ph 7.31, PCO2 95, PHCO3 47.8 on admit  - ABG yesterday: pH 7.393 pCO2 62.2 pO2 86 and HCO3 37.9  - Pt with previous smoking hx with COPD. On O2 at nursing home  - No concern for acute infectious pulm process at this time  - CXR negative for infectious etiology, concerning for CHF pattern  - Continue Duonebs, home breo   - Pulm consulted, appreciate rec's     Heme/ID:  - WBC 11.12. Pt with recent UTI- resolved  - vitals stable  - UA with trace leukocytes. urine cx - no growth.   - Cipro discontinued.     Renal:  - Cr 1.0 at baseline  - BUN/Cr 30/0.9 this AM  - Pratt cath DC.     Endo:  - Continue ome Synthroid 100 mcg. Will hold home Metformin and Glimiperide  - TSH 1.261, T4 0.74  - HbA1c: 6.6. Hba1c 8.7% (11/2015)  - Accuchecks q4  - SSI     FEN/GI:  - Cardiac and diabetic diet     PPx: Lovenox and Protonix     Dispo: awaiting EEG results.     Watson Quintanilla MD  [facility]  2  06/28/2017 12:15 PM

## 2017-06-28 NOTE — PLAN OF CARE
Problem: Patient Care Overview  Goal: Plan of Care Review  Outcome: Ongoing (interventions implemented as appropriate)  Pt's SpO2 97% on 2 lpm NC. RT asked Pt to be place on BIPAP but Pt stated she is waiting for pain medication. Pt will notify RN to not let RT know when she is ready to be place on bipap mask. No respiratory distress noted. Will continue to monitor SpO2.

## 2017-06-28 NOTE — PLAN OF CARE
Upon rounding on pt, pt stated to nurse that her vagina was burning.  Nurse assessed pt's vagina and there was no sign of any discharge or inflammation.  Nurse called Dr. Quintanilla to inform her and she stated that they will address it with the pt.

## 2017-06-28 NOTE — PHYSICIAN QUERY
PT Name: Michelle Godfrey  MR #: [id number]    Physician Query Form - Consultant Diagnosis Clarification     CDS/: Rachel Ochoa               Contact information: [phone]  This form is a permanent document in the medical record.     Query Date: June 28, 2017      By submitting this query, we are merely seeking further clarification of documentation.  Please utilize your independent clinical judgment when addressing the question(s) below.      The Medical record contains the following:   Diagnosis Supporting Clinical Information Location in Medical Record   Narcotic Encephalopathy         AMS reversed s/p narcan x2 yesterday  Would  on alternate pain control methods        Mentation altered PN 6/27        PN 6/27            H&P         Do you agree with the Consultants diagnosis of Narcotic Encephalopathy?                 [ x  ]   Yes               [   ]   Yes, but it resolved prior to my assessment of the patient               [   ]   No                [   ]   Clinically insignificant               [   ]   Clinically undetermined               [   ]   Other/Clarification of findings: ___________________________________________

## 2017-06-28 NOTE — PT/OT/SLP PROGRESS
Physical Therapy  Treatment    Michelle Godfrey   MRN: [id number]   Admitting Diagnosis: Respiratory failure with hypercapnia    PT Received On: 06/28/17  PT Start Time: 1523     PT Stop Time: 1606    PT Total Time (min): 43 min       Billable Minutes:  Therapeutic Activity 34 and Therapeutic Exercise 9    Treatment Type: Treatment  PT/PTA: PT             General Precautions: Standard, fall, respiratory  Orthopedic Precautions: N/A   Braces: N/A    Do you have any cultural, spiritual, Yarsanism conflicts, given your current situation?: none    Subjective:  Communicated with nursing prior to session.      Pain/Comfort  Pain Rating 1: 6/10  Location - Orientation 1: generalized  Location 1:  (back and neck)  Pain Addressed 1: Reposition, Distraction, Cessation of Activity, Nurse notified  Pain Rating Post-Intervention 1: 7/10    Objective:   Patient found with: telemetry, oxygen    Functional Mobility:  Bed Mobility:   Supine to Sit: Stand by Assistance, With side rail (SBA if HOB elevated otherwise needs min assist )  Sit to Supine: Stand by Assistance, With siderail    Transfers:  Sit <> Stand Assistance: Stand By Assistance  Sit <> Stand Assistive Device: Rolling Walker    Gait:   Gait Distance: 30' .    Assistance 1: Contact Guard Assistance, Stand by Assistance  Gait Assistive Device: Rolling walker  Gait Pattern: reciprocal  Gait Deviation(s): decreased [place], decreased step length, decreased stride length    Stairs:  Not assessed    Balance:   Static Sit: NORMAL: No deviations seen in posture held statically  Dynamic Sit: GOOD: Maintains balance through MODERATE excursions of active trunk movement  Static Stand: FAIR+: Takes MINIMAL challenges from all directions  Dynamic stand: FAIR+: Needs CLOSE SUPERVISION during gait and is able to right self with minor LOB     Therapeutic Activities and Exercises:   Pt scheduled for dc back to NH.  O2 sats on 2 LPM 97%.  Able to stand with SBA and amb 30' with RW and sats  only down to 94%,  Instructed in deep breathing ex.  Performed LE ex in sitting-LAQ, hip flex and AP 2 sets of 10    AM-PAC 6 CLICK MOBILITY  How much help from another person does this patient currently need?   1 = Unable, Total/Dependent Assistance  2 = A lot, Maximum/Moderate Assistance  3 = A little, Minimum/Contact Guard/Supervision  4 = None, Modified [place]/Independent    Turning over in bed (including adjusting bedclothes, sheets and blankets)?: 3  Sitting down on and standing up from a chair with arms (e.g., wheelchair, bedside commode, etc.): 3  Moving from lying on back to sitting on the side of the bed?: 3  Moving to and from a bed to a chair (including a wheelchair)?: 3  Need to walk in hospital room?: 3  Climbing 3-5 steps with a railing?: 1  Total Score: 16    AM-PAC Raw Score CMS G-Code Modifier Level of Impairment Assistance   6 % Total / Unable   7 - 9 CM 80 - 100% Maximal Assist   10 - 14 CL 60 - 80% Moderate Assist   15 - 19 CK 40 - 60% Moderate Assist   20 - 22 CJ 20 - 40% Minimal Assist   23 CI 1-20% SBA / CGA   24 CH 0% Independent/ Mod I     Patient left HOB elevated with all lines intact, call button in reach, bed alarm on and nurse notified.    Assessment: transfer to alternate level of care  Michelle Godfrey is a 56 y.o. female with a medical diagnosis of Respiratory failure with hypercapnia and presents with Improved from PT eval yesterday.  Would benefit from cont therapy post acute to maximize functional mobility and decrease burden of care  .    Rehab identified problem list/impairments: Rehab identified problem list/impairments: weakness, impaired endurance, impaired cardiopulmonary response to activity, impaired balance, impaired functional mobilty, impaired self care skills, gait instability    Rehab potential is good.    Activity tolerance: Fair    Discharge recommendations: Discharge Facility/Level Of Care Needs: nursing facility, skilled     Barriers to discharge:  Barriers to Discharge: None    Equipment recommendations: Equipment Needed After Discharge: none     GOALS: not met   Physical Therapy Goals        Problem: Physical Therapy Goal    Goal Priority Disciplines Outcome Goal Variances Interventions   Physical Therapy Goal     PT/OT, PT Unable to achieve outcome(s) by discharge     Description:  Goals to be met by: 17     Patient will increase functional independence with mobility by performin. Supine to sit with S  2. Sit to supine with S  3. Sit to stand transfer with Contact Guard Assistance  3. Gait  x 50 feet with Contact Guard Assistance using Rolling Walker.                        PLAN:  Dc pending to NH today - PT/OT recommend SNF.    Patient to be seen 6 x/week  to address the above listed problems via gait training, therapeutic activities, therapeutic exercises  Plan of Care expires: 17  Plan of Care reviewed with: patient         Tricia KESHAWN Bolton, PT  2017

## 2017-06-28 NOTE — PROGRESS NOTES
TN called [facility] and spoke with Janee (Agueda is the  who had left for the day) who stated that they had recalled the  back and will send someone with oxygen to  the patient tomorrow.  TN stated this was not acceptable.  TN arranged for [facility] to come  the patient for transport to [facility]. Expect arrival in 1-2 hours, TN notified Latoya RODRIGUEZ.  Rajiv Romo RN  Transitional Navigator/Case Management  [phone]

## 2017-06-28 NOTE — PLAN OF CARE
Problem: Physical Therapy Goal  Goal: Physical Therapy Goal  Goals to be met by: 17     Patient will increase functional independence with mobility by performin. Supine to sit with S  2. Sit to supine with S  3. Sit to stand transfer with Contact Guard Assistance  3. Gait  x 50 feet with Contact Guard Assistance using Rolling Walker.       Outcome: Unable to achieve outcome(s) by discharge  Pt scheduled for dc back to NH.  O2 sats on 2 LPM 97%.  Able to stand with SBA and amb 30' with RW and sats only down to 94%,  Improved from PT eval yesterday.  Would benefit from cont therapy post acute to maximize functional mobility and decrease burden of care  REC:  SNF

## 2017-06-28 NOTE — PROGRESS NOTES
TN notified patient that we have DC orders, she stated that she spoke with a rep from [facility] and that they would come and see her at [facility] in [place]. TN faxed Facility Transfer ORders to [facility] [place] and spoke with Agueda, she stated she would review the orders and notify me when ok to call report and set up transport.  TN notified Latoya RODRIGUEZ [phone].  Rajiv Romo RN  Transitional Navigator/Case Management  [phone]

## 2017-06-28 NOTE — PROGRESS NOTES
[facility]    FACILITY TRANSFER ORDERS      Patient Name: Michelle Godfrey  YOB: 1960    PCP: Jacy Garvey MD   PCP Address: [address] / [place] [address]  PCP Phone Number: [phone]  PCP Fax: [phone]    Encounter Date: 06/28/2017    Admit to: [facility]    Vital Signs:  Routine    Diagnoses:   Active Hospital Problems    Diagnosis  POA    *Respiratory failure with hypercapnia [J96.92]  Yes    Chronic bronchitis [J42]  Yes    Bipolar 1 disorder [F31.9]  Yes    Essential hypertension [I10]  Yes    COPD (chronic obstructive pulmonary disease) [J44.9]  Yes    Uncontrolled type 2 diabetes mellitus with complication, without long-term current use of insulin [E11.8, E11.65]  Yes    Acquired hypothyroidism [E03.9]  Yes      Resolved Hospital Problems    Diagnosis Date Resolved POA    Seizure [R56.9] 06/28/2017 No       Allergies:  Review of patient's allergies indicates:   Allergen Reactions    Bactrim [sulfamethoxazole-trimethoprim] Diarrhea    Keflex [cephalexin] Other (See Comments)     Yeast infections       Diet: cardiac diet    Activities: Activity as tolerated       Labs: POCT glucose qAC and HS     CONSULTS:    Physical Therapy to evaluate and treat. , Occupational Therapy to evaluate and treat. and  to evaluate for community resources/long-range planning.    Medications: Review discharge medications with patient and family and provide education.      Current Discharge Medication List      START taking these medications    Details   alprazolam (XANAX) 0.5 MG tablet Take 1 tablet (0.5 mg total) by mouth 3 (three) times daily as needed for Anxiety (Withdrawal).  Qty: 30 tablet, Refills: 0      hydrocodone-acetaminophen 5-325mg (NORCO) 5-325 mg per tablet Take 1 tablet by mouth every 6 (six) hours as needed.  Qty: 30 tablet, Refills: 0         CONTINUE these medications which have CHANGED    Details   divalproex ER (DEPAKOTE) 500 MG Tb24 Take  3 tablets (1,500 mg total) by mouth 2 (two) times daily.  Refills: 0         CONTINUE these medications which have NOT CHANGED    Details   albuterol (PROVENTIL) 2.5 mg /3 mL (0.083 %) nebulizer solution Take 2.5 mg by nebulization every 6 (six) hours as needed for Wheezing.      allopurinol (ZYLOPRIM) 100 MG tablet Take 100 mg by mouth 2 (two) times daily.   Refills: 5      aspirin (ECOTRIN) 81 MG EC tablet Take 81 mg by mouth once daily.      !! BD ULTRA FINE LANCETS 33 gauge Misc Refills: 11      budesonide-formoterol 160-4.5 mcg (SYMBICORT) 160-4.5 mcg/actuation HFAA Inhale 2 puffs into the lungs every 12 (twelve) hours.      escitalopram oxalate (LEXAPRO) 10 MG tablet Take 10 mg by mouth once daily.      gabapentin (NEURONTIN) 300 MG capsule Take 300 mg by mouth 3 (three) times daily.      ipratropium (ATROVENT) 0.02 % nebulizer solution Take 500 mcg by nebulization 4 (four) times daily. Rescue      levothyroxine (SYNTHROID) 100 MCG tablet Take 1 tablet (100 mcg total) by mouth before breakfast.  Qty: 30 tablet, Refills: 5    Associated Diagnoses: Acquired hypothyroidism; Uncontrolled type 2 diabetes mellitus with complication, unspecified long term insulin use status      lisinopril (PRINIVIL,ZESTRIL) 2.5 MG tablet Take 2.5 mg by mouth once daily.      metformin (GLUCOPHAGE) 1000 MG tablet Take 1 tablet (1,000 mg total) by mouth 2 (two) times daily.  Qty: 60 tablet, Refills: 0      pantoprazole (PROTONIX) 40 MG tablet Take 40 mg by mouth nightly.   Refills: 5      pravastatin (PRAVACHOL) 20 MG tablet Take 20 mg by mouth every evening.   Refills: 5      TRUERESULT BLOOD GLUCOSE SYSTM kit Refills: 0      !! ULTRA THIN LANCETS 30 gauge Misc Refills: 0       !! - Potential duplicate medications found. Please discuss with provider.      STOP taking these medications       baclofen (LIORESAL) 10 MG tablet Comments:   Reason for Stopping:         ciprofloxacin HCl (CIPRO) 500 MG tablet Comments:   Reason for  Stopping:         citalopram (CELEXA) 40 MG tablet Comments:   Reason for Stopping:         diazePAM (VALIUM) 5 MG tablet Comments:   Reason for Stopping:         diltiazem (CARDIZEM CD) 240 MG 24 hr capsule Comments:   Reason for Stopping:         fluticasone 50 mcg/actuation DsDv Comments:   Reason for Stopping:         furosemide (LASIX) 40 MG tablet Comments:   Reason for Stopping:         glimepiride (AMARYL) 1 MG tablet Comments:   Reason for Stopping:         hydrocodone-acetaminophen 10-325mg (NORCO)  mg Tab Comments:   Reason for Stopping:         lorazepam (ATIVAN) 0.5 MG tablet Comments:   Reason for Stopping:         magnesium oxide (MAG-OX) 400 mg tablet Comments:   Reason for Stopping:         predniSONE (DELTASONE) 10 MG tablet Comments:   Reason for Stopping:         probenecid (BENEMID) 500 mg Tab Comments:   Reason for Stopping:         promethazine (PHENERGAN) 25 MG tablet Comments:   Reason for Stopping:         theophylline (THEODUR) 300 MG 12 hr tablet Comments:   Reason for Stopping:         zolpidem (AMBIEN) 10 mg Tab Comments:   Reason for Stopping:                    _________________________________  Niyah Aiken MD  06/28/2017

## 2017-06-28 NOTE — PLAN OF CARE
Problem: Fall Risk (Adult)  Goal: Absence of Falls  Patient will demonstrate the desired outcomes by discharge/transition of care.   Outcome: Ongoing (interventions implemented as appropriate)  PT LYING IN BED. NO DISTRESS NOTED. CALL BELL WITHIN REACH. PT IS ABLE TO MAKE NEEDS KNOWN. BED ALARM IS ON AND PT IS CLOSE TO NURSE STATION. PT INSTRUCTED TO CALL BEFORE MOVING IN BED.

## 2017-06-28 NOTE — PLAN OF CARE
Problem: Patient Care Overview  Goal: Plan of Care Review  Sats 94% 2L NC; tolerating well; aerosol completed without incident; will continue to monitor.

## 2017-06-28 NOTE — PROGRESS NOTES
The Sw spoke to Luisa at [facility] who states she did reach out to the pt's sister but she informed her she's not sure if they want the pt to switch. The Sw informed her the pt's ready for d/c and she states she will reach out to her again b/c she has to get the info from the other nh. She states she will call the Sw back after she speaks with  The pt's sister. She informed the Sw they can admit the pt from her current nh. The Sw informed Rajiv of this info. The Sw called [facility]( [phone])and left a message for Nikki to return the call.

## 2017-06-28 NOTE — PROGRESS NOTES
[facility]    FACILITY TRANSFER ORDERS      Patient Name: Michelle Godfrey  YOB: 1960    PCP: Jacy Garvey MD   PCP Address: [address] / [place] [address]  PCP Phone Number: [phone]  PCP Fax: [phone]    Encounter Date: 06/28/2017    Admit to: [facility]    Vital Signs:  Routine    Diagnoses:   Active Hospital Problems    Diagnosis  POA    *Respiratory failure with hypercapnia [J96.92]  Yes    Chronic bronchitis [J42]  Yes    Bipolar 1 disorder [F31.9]  Yes    Essential hypertension [I10]  Yes    COPD (chronic obstructive pulmonary disease) [J44.9]  Yes    Uncontrolled type 2 diabetes mellitus with complication, without long-term current use of insulin [E11.8, E11.65]  Yes    Acquired hypothyroidism [E03.9]  Yes      Resolved Hospital Problems    Diagnosis Date Resolved POA    Seizure [R56.9] 06/28/2017 No       Allergies:  Review of patient's allergies indicates:   Allergen Reactions    Bactrim [sulfamethoxazole-trimethoprim] Diarrhea    Keflex [cephalexin] Other (See Comments)     Yeast infections       Diet: cardiac diet    Activities: Activity as tolerated       CONSULTS:    Physical Therapy to evaluate and treat.  and Occupational Therapy to evaluate and treat.    MISCELLANEOUS CARE:  CPAP   QHS at +9    WOUND CARE ORDERS  None    Medications: Review discharge medications with patient and family and provide education.      Current Discharge Medication List      START taking these medications    Details   alprazolam (XANAX) 0.5 MG tablet Take 1 tablet (0.5 mg total) by mouth 3 (three) times daily as needed for Anxiety (Withdrawal).  Qty: 30 tablet, Refills: 0      hydrocodone-acetaminophen 5-325mg (NORCO) 5-325 mg per tablet Take 1 tablet by mouth every 6 (six) hours as needed.  Qty: 30 tablet, Refills: 0         CONTINUE these medications which have CHANGED    Details   divalproex ER (DEPAKOTE) 500 MG Tb24 Take 3 tablets (1,500 mg total) by mouth 2 (two)  times daily.      furosemide (LASIX) 20 MG tablet Take 0.5 tablets (10 mg total) by mouth 2 (two) times daily.  Qty: 30 tablet, Refills: 0         CONTINUE these medications which have NOT CHANGED    Details   albuterol (PROVENTIL) 2.5 mg /3 mL (0.083 %) nebulizer solution Take 2.5 mg by nebulization every 6 (six) hours as needed for Wheezing.      allopurinol (ZYLOPRIM) 100 MG tablet Take 100 mg by mouth 2 (two) times daily.   Refills: 5      aspirin (ECOTRIN) 81 MG EC tablet Take 81 mg by mouth once daily.      !! BD ULTRA FINE LANCETS 33 gauge Misc Refills: 11      budesonide-formoterol 160-4.5 mcg (SYMBICORT) 160-4.5 mcg/actuation HFAA Inhale 2 puffs into the lungs every 12 (twelve) hours.      escitalopram oxalate (LEXAPRO) 10 MG tablet Take 10 mg by mouth once daily.      gabapentin (NEURONTIN) 300 MG capsule Take 300 mg by mouth 3 (three) times daily.      ipratropium (ATROVENT) 0.02 % nebulizer solution Take 500 mcg by nebulization 4 (four) times daily. Rescue      levothyroxine (SYNTHROID) 100 MCG tablet Take 1 tablet (100 mcg total) by mouth before breakfast.  Qty: 30 tablet, Refills: 5    Associated Diagnoses: Acquired hypothyroidism; Uncontrolled type 2 diabetes mellitus with complication, unspecified long term insulin use status      lisinopril (PRINIVIL,ZESTRIL) 2.5 MG tablet Take 2.5 mg by mouth once daily.      metformin (GLUCOPHAGE) 1000 MG tablet Take 1 tablet (1,000 mg total) by mouth 2 (two) times daily.  Qty: 60 tablet, Refills: 0      pantoprazole (PROTONIX) 40 MG tablet Take 40 mg by mouth nightly.   Refills: 5      pravastatin (PRAVACHOL) 20 MG tablet Take 20 mg by mouth every evening.   Refills: 5      TRUERESULT BLOOD GLUCOSE SYSTM kit Refills: 0      !! ULTRA THIN LANCETS 30 gauge Misc Refills: 0       !! - Potential duplicate medications found. Please discuss with provider.      STOP taking these medications       baclofen (LIORESAL) 10 MG tablet Comments:   Reason for Stopping:          ciprofloxacin HCl (CIPRO) 500 MG tablet Comments:   Reason for Stopping:         citalopram (CELEXA) 40 MG tablet Comments:   Reason for Stopping:         diazePAM (VALIUM) 5 MG tablet Comments:   Reason for Stopping:         diltiazem (CARDIZEM CD) 240 MG 24 hr capsule Comments:   Reason for Stopping:         fluticasone 50 mcg/actuation DsDv Comments:   Reason for Stopping:         glimepiride (AMARYL) 1 MG tablet Comments:   Reason for Stopping:         hydrocodone-acetaminophen 10-325mg (NORCO)  mg Tab Comments:   Reason for Stopping:         lorazepam (ATIVAN) 0.5 MG tablet Comments:   Reason for Stopping:         magnesium oxide (MAG-OX) 400 mg tablet Comments:   Reason for Stopping:         predniSONE (DELTASONE) 10 MG tablet Comments:   Reason for Stopping:         probenecid (BENEMID) 500 mg Tab Comments:   Reason for Stopping:         promethazine (PHENERGAN) 25 MG tablet Comments:   Reason for Stopping:         theophylline (THEODUR) 300 MG 12 hr tablet Comments:   Reason for Stopping:         zolpidem (AMBIEN) 10 mg Tab Comments:   Reason for Stopping:                    _________________________________  Niyah Aiken MD  06/28/2017

## 2017-06-28 NOTE — PLAN OF CARE
TN spoke with patient, she is amicable to going to [facility] correction if she is accepted, ultimately wants to make her way back home but would also be amicable to returning to [facility] if she doesn't have the help she needs and is not accepted to [place].  Patient complained that there were insects in her food there.  TN spoke to patient's daughter who states that the grand-daughter Mckenzie is currently living at the patient's house, will provide TN with her accurate number.     06/28/17 1236   Discharge Reassessment   Assessment Type Discharge Planning Reassessment   Can the patient answer the patient profile reliably? Yes, cognitively intact   Describe the patient's ability to walk at the present time. Major restrictions/daily assistance from another person   How often would a person be available to care for the patient? Often   Discharge Plan A Return to nursing home   Discharge Plan B Home with family
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
## 2017-06-28 NOTE — PROGRESS NOTES
The Sw spoke to Nikki at [facility] and she states they can't meet the pt's needs so they will not be able to accept the pt. The sw informed Rajiv of this info.      3:28pm The Sw received a call from Luisa at [facility] stating she will go to [facility] tomorrow and assess the pt for admittance to her facility.

## 2017-06-28 NOTE — PLAN OF CARE
TN spoke with [facility], patient is going to be picked up by their  at approximately 5pm, TN reminded her that the patient needs to be placed on O2 for the trip back to [facility].  TN faxed updated orders and scripts to [facility].     06/28/17 1620   Final Note   Assessment Type Final Discharge Note   Discharge Disposition [facility]   Discharge planning education complete? Yes   What phone number can be called within the next 1-3 days to see how you are doing after discharge? [phone]   Hospital Follow Up  Appt(s) scheduled? Yes   Discharge plans and expectations educations in teach back method with documentation complete? Yes   Offered [facility] -- Bedside Delivery? n/a   Discharge/Hospital Encounter Summary to (non[facility]) PCP n/a   Referral to Outpatient Case Management complete? n/a   Referral to / orders for Home Health Complete? n/a   30 day supply of medicines given at discharge, if documented non-compliance / non-adherence? n/a   Any social issues identified prior to discharge? No   Did you assess the readiness or willingness of the family or caregiver to support self management of care? n/a   Right Care Referral Info   Post Acute Recommendation Other

## 2017-06-28 NOTE — DISCHARGE SUMMARY
[facility] Hospital Medicine  Discharge Summary      Patient Name: Michelle Godfrey  MRN: [id number]  Admission Date: 6/26/2017  Hospital Length of Stay: 2 days  Discharge Date and Time:  06/28/2017 3:32 PM  Attending Physician: Pratima Berry MD   Discharging Provider: Naima Avalos MD  Primary Care Provider: Jacy Garvey MD        HPI: Patient is a 56 y.o. female with PMHx of COPD, DM2, HTN, Hypercapnic respiratory failure transferred from [facility]. Pt is resident at [facility] and was found to be difficult to arouse. POCT glucose at that time was 57. Was taken to [place] and given D50 and snack and BG increased to 74. ABG revealed pH 7.3, PCO2 95, pHCO3 47. Pt was started on BIPAP and transferred for further monitoring. No family at bedside. Currently on 3/7 day abx course of ciprofloxacin.    * No surgery found *      Indwelling Lines/Drains at time of discharge:   Lines/Drains/Airways          No matching active lines, drains, or airways        Hospital Course: Michelle Godfrey is a 56 y.o. female with PMHx of COPD, DM, HTN, and hypercapnic respiratory failure presented with hypercapnia and hypoglycemia. Pt was evaluated in the ED and found to be lethargic and hypoglycemic. ABG showed hypercapnic respiratory failure. CT head negative. Utox positive for Benzos and Opiates. Home Baclofen, Lexapro, Diazepam, Norco, and Depakote were held. Pt transferred to ICU on continuous BIPAP. Pt received 2 doses of Narcan. Pt's mental status and ABG improved. Pulmonary consulted. CXR negative and pt taken off of BIPAP. Pt placed on BIPAP at night and 2L NC during the day. Pt remained stable. Due to AMS, EEG was obtained to r/o seizure activity. EEG was performed but was pending at the time of discharge. Patient had improvement in her mental status. Consulted  who established transport to [facility] for rehabilitation.     Consults:   Consults         Status Ordering  Provider     Inpatient consult to Pulmonology  Once     Provider:  (Not yet assigned)    Completed ESTELLA ROJAS          Significant Diagnostic Studies: Labs:   CMP   Recent Labs  Lab 06/27/17  0333 06/28/17  0713    139   K 4.3 4.7    101   CO2 34* 28   * 171*   BUN 24* 30*   CREATININE 0.8 0.9   CALCIUM 10.2 10.0   PROT 6.7 6.9   ALBUMIN 3.0* 3.2*   BILITOT 0.4 0.5   ALKPHOS 63 60   AST 13 13   ALT 9* 8*   ANIONGAP 7* 10   ESTGFRAFRICA >60 >60   EGFRNONAA >60 >60   , CBC   Recent Labs  Lab 06/27/17  0333 06/28/17  0713   WBC 12.17 11.12   HGB 11.5* 11.5*   HCT 37.2 37.2    216       Pending Diagnostic Studies:     None        Final Active Diagnoses:    Diagnosis Date Noted POA    PRINCIPAL PROBLEM:  Respiratory failure with hypercapnia [J96.92] 06/26/2017 Yes    Chronic bronchitis [J42] 06/27/2017 Yes    Bipolar 1 disorder [F31.9] 06/26/2017 Yes    Essential hypertension [I10] 11/26/2015 Yes    COPD (chronic obstructive pulmonary disease) [J44.9] 11/25/2015 Yes    Uncontrolled type 2 diabetes mellitus with complication, without long-term current use of insulin [E11.8, E11.65] 11/25/2015 Yes    Acquired hypothyroidism [E03.9] 11/25/2015 Yes      Problems Resolved During this Admission:    Diagnosis Date Noted Date Resolved POA    Seizure [R56.9]  06/28/2017 No      Discharged Condition: stable    Disposition: Skilled Nursing Facility    Follow Up:  Follow-up Information     Jacy Garvey MD In 1 week.    Specialty:  Family Medicine  Contact information:  [address]  [place] [address] [phone]                 Patient Instructions:     Diet Cardiac     Diet Diabetic 2000 Calories     Diet renal     Activity as tolerated     Call MD for:  temperature >100.4     Call MD for:  persistent nausea and vomiting or diarrhea     Call MD for:  severe uncontrolled pain     Call MD for:  redness, tenderness, or signs of infection (pain, swelling, redness, odor or green/yellow  discharge around incision site)     Call MD for:  difficulty breathing or increased cough     Call MD for:  severe persistent headache     Call MD for:  worsening rash     Call MD for:  persistent dizziness, light-headedness, or visual disturbances     Call MD for:  increased confusion or weakness       Medications:  Reconciled Home Medications:   Discharge Medication List as of 6/28/2017  3:49 PM      START taking these medications    Details   alprazolam (XANAX) 0.5 MG tablet Take 1 tablet (0.5 mg total) by mouth 3 (three) times daily as needed for Anxiety (Withdrawal)., Starting Wed 6/28/2017, Until Fri 7/28/2017, Print      hydrocodone-acetaminophen 5-325mg (NORCO) 5-325 mg per tablet Take 1 tablet by mouth every 6 (six) hours as needed., Starting Wed 6/28/2017, Print         CONTINUE these medications which have CHANGED    Details   divalproex ER (DEPAKOTE) 500 MG Tb24 Take 3 tablets (1,500 mg total) by mouth 2 (two) times daily., Starting Wed 6/28/2017, No Print         CONTINUE these medications which have NOT CHANGED    Details   albuterol (PROVENTIL) 2.5 mg /3 mL (0.083 %) nebulizer solution Take 2.5 mg by nebulization every 6 (six) hours as needed for Wheezing., Until Discontinued, Historical Med      allopurinol (ZYLOPRIM) 100 MG tablet Take 100 mg by mouth 2 (two) times daily. , Starting 5/27/2015, Until Discontinued, Historical Med      aspirin (ECOTRIN) 81 MG EC tablet Take 81 mg by mouth once daily., Until Discontinued, Historical Med      !! BD ULTRA FINE LANCETS 33 gauge Misc Starting 7/2/2015, Until Discontinued, Historical Med      budesonide-formoterol 160-4.5 mcg (SYMBICORT) 160-4.5 mcg/actuation HFAA Inhale 2 puffs into the lungs every 12 (twelve) hours., Until Discontinued, Historical Med      escitalopram oxalate (LEXAPRO) 10 MG tablet Take 10 mg by mouth once daily., Historical Med      gabapentin (NEURONTIN) 300 MG capsule Take 300 mg by mouth 3 (three) times daily., Historical Med       ipratropium (ATROVENT) 0.02 % nebulizer solution Take 500 mcg by nebulization 4 (four) times daily. Rescue, Historical Med      levothyroxine (SYNTHROID) 100 MCG tablet Take 1 tablet (100 mcg total) by mouth before breakfast., Starting Thu 3/2/2017, Until Tue 8/29/2017, Normal      lisinopril (PRINIVIL,ZESTRIL) 2.5 MG tablet Take 2.5 mg by mouth once daily., Historical Med      metformin (GLUCOPHAGE) 1000 MG tablet Take 1 tablet (1,000 mg total) by mouth 2 (two) times daily., Starting 11/30/2015, Until Discontinued, Print      pantoprazole (PROTONIX) 40 MG tablet Take 40 mg by mouth nightly. , Starting 5/27/2015, Until Discontinued, Historical Med      pravastatin (PRAVACHOL) 20 MG tablet Take 20 mg by mouth every evening. , Starting 6/25/2015, Until Discontinued, Historical Med      TRUERESULT BLOOD GLUCOSE SYSTM kit Historical Med      !! ULTRA THIN LANCETS 30 gauge Misc Starting 10/9/2015, Until Discontinued, Historical Med       !! - Potential duplicate medications found. Please discuss with provider.      STOP taking these medications       baclofen (LIORESAL) 10 MG tablet Comments:   Reason for Stopping:         ciprofloxacin HCl (CIPRO) 500 MG tablet Comments:   Reason for Stopping:         citalopram (CELEXA) 40 MG tablet Comments:   Reason for Stopping:         diazePAM (VALIUM) 5 MG tablet Comments:   Reason for Stopping:         diltiazem (CARDIZEM CD) 240 MG 24 hr capsule Comments:   Reason for Stopping:         fluticasone 50 mcg/actuation DsDv Comments:   Reason for Stopping:         furosemide (LASIX) 40 MG tablet Comments:   Reason for Stopping:         glimepiride (AMARYL) 1 MG tablet Comments:   Reason for Stopping:         hydrocodone-acetaminophen 10-325mg (NORCO)  mg Tab Comments:   Reason for Stopping:         lorazepam (ATIVAN) 0.5 MG tablet Comments:   Reason for Stopping:         magnesium oxide (MAG-OX) 400 mg tablet Comments:   Reason for Stopping:         predniSONE (DELTASONE) 10  MG tablet Comments:   Reason for Stopping:         probenecid (BENEMID) 500 mg Tab Comments:   Reason for Stopping:         promethazine (PHENERGAN) 25 MG tablet Comments:   Reason for Stopping:         theophylline (THEODUR) 300 MG 12 hr tablet Comments:   Reason for Stopping:         zolpidem (AMBIEN) 10 mg Tab Comments:   Reason for Stopping:             Time spent on the discharge of patient: >30 minutes    Naima Avalos MD  Department of Hospital Medicine  [facility]

## 2017-06-29 ENCOUNTER — PATIENT OUTREACH (OUTPATIENT)
Dept: ADMINISTRATIVE | Facility: CLINIC | Age: 57
End: 2017-06-29

## 2017-06-29 NOTE — PROGRESS NOTES
C3 nurse attempted to conduct a POST ACUTE 1 call with [facility].  Spoke with KAITLIN Hendricks at [facility].  She reports patient was admitted at [facility], not skilled.  POST ACUTE 1 call not indicated.

## 2017-07-05 NOTE — PHYSICIAN QUERY
PT Name: Michelle Godfrey  MR #: [id number]  Physician Query Form - CKD Clarification     CDS/: Carola Ventura               Contact information: [email or web address]    This form is a permanent document in the medical record.     Query Date: July 5, 2017    By submitting this query, we are merely seeking further clarification of documentation. Please utilize your independent clinical judgment when addressing the question(s) below.    The Medical record contains the following:     Indicators   Supporting Clinical Findings   Location in Medical Record   x CKD or Chronic Kidney (Renal) Failure / Disease Chronic kidney disease  H&P pmh   x BUN/Creatinine                          GFR Renal:   - Cr 1.0 at baseline   - BUN/Cr 43/0.9     GFR >60 Lab          Dehydration      Nausea / Vomiting      Dialysis / CRRT      Medication      Treatment     x Other Chronic Conditions Patient is a 56 y.o. female with PMHx of COPD, DM2, HTN, Hypercapnic respiratory failure transferred from [facility] H&P    Other       Provider, please further specify the stage of CKD.      [  ] Chronic Kidney Disease (CKD) (please specify stage* below)       [employer] Definitions  Stage Description  eGFR (mL/min)   [  ]     I Slight kidney damage with normal or increased filtration 90+   [ x ]     II Mildly reduced kidney function 60-89   [  ]     III Moderately reduced kidney function 30-59   [  ]     IV Severely reduced kidney function 15-29   [  ]     V Kidney failure, requiring transplant or dialysis <15     [  ] Other (please specify): ________________________  [  ] Clinically Undetermined    Please document in your progress notes daily for the duration of treatment until resolved and include in your discharge summary.

## 2017-07-10 ENCOUNTER — LAB VISIT (OUTPATIENT)
Dept: LAB | Facility: HOSPITAL | Age: 57
End: 2017-07-10
Attending: FAMILY MEDICINE
Payer: MEDICAID

## 2017-07-10 DIAGNOSIS — R60.9 EDEMA: ICD-10-CM

## 2017-07-10 DIAGNOSIS — J02.9 SORE THROAT: Primary | ICD-10-CM

## 2017-07-10 LAB
ANION GAP SERPL CALC-SCNC: 10 MMOL/L
BNP SERPL-MCNC: 37 PG/ML
BUN SERPL-MCNC: 24 MG/DL
CALCIUM SERPL-MCNC: 9.3 MG/DL
CHLORIDE SERPL-SCNC: 102 MMOL/L
CO2 SERPL-SCNC: 30 MMOL/L
CREAT SERPL-MCNC: 0.8 MG/DL
DEPRECATED S PYO AG THROAT QL EIA: NEGATIVE
EST. GFR  (AFRICAN AMERICAN): >60 ML/MIN/1.73 M^2
EST. GFR  (NON AFRICAN AMERICAN): >60 ML/MIN/1.73 M^2
GLUCOSE SERPL-MCNC: 138 MG/DL
POTASSIUM SERPL-SCNC: 5.1 MMOL/L
SODIUM SERPL-SCNC: 142 MMOL/L

## 2017-07-10 PROCEDURE — 87880 STREP A ASSAY W/OPTIC: CPT

## 2017-07-10 PROCEDURE — 87081 CULTURE SCREEN ONLY: CPT

## 2017-07-10 PROCEDURE — 80048 BASIC METABOLIC PNL TOTAL CA: CPT

## 2017-07-10 PROCEDURE — 83880 ASSAY OF NATRIURETIC PEPTIDE: CPT

## 2017-07-10 PROCEDURE — 36415 COLL VENOUS BLD VENIPUNCTURE: CPT

## 2017-07-12 NOTE — PROCEDURES
DATE OF STUDY:  06/27/2017    EEG NUMBER:  [id number].    LOCATION OF SERVICE:  Room 267.    ELECTROENCEPHALOGRAM REPORT    METHODOLOGY:  Electroencephalographic (EEG) recording is recorded with   electrodes placed according to the International 10-20 placement system.  Thirty   two (32) channels of digital signal (sampling rate of 512/sec), including T1   and T2, were simultaneously recorded from the scalp and may include EKG, EMG,   and/or eye monitors.  Recording band pass was 0.1 to 512 Hz.  Digital video   recording of the patient is simultaneously recorded with the EEG.  The patient   is instructed to report clinical symptoms which may occur during the recording   session.  EEG and video recording are stored and archived in digital format.    Activation procedures, which include photic stimulation, hyperventilation and   instructing patients to perform simple tasks, are done in selected patients.    The EEG is displayed on a monitor screen and can be reviewed using different   montages.  Computer assisted-analysis is employed to detect spike and   electrographic seizure activity.   The entire record is submitted for computer   analysis.  The entire recording is visually reviewed, and the times identified   by computer analysis as being spikes or seizures are reviewed again.    Compressed spectral analysis (CSA) is also performed on the activity recorded   from each individual channel.  This is displayed as a power display of   frequencies from 0 to 30 Hz over time.   The CSA is reviewed looking for   asymmetries in power between homologous areas of the scalp, then compared with   the original EEG recording.    [employer] software was also utilized in the review of this study.  This software   suite analyzes the EEG recording in multiple domains.  Coherence and rhythmicity   are computed to identify EEG sections which may contain organized seizures.    Each channel undergoes analysis to detect the presence of spike  and sharp waves   which have special and morphological characteristics of epileptic activity.  The   routine EEG recording is converted from special into frequency domain.  This is   then displayed comparing homologous areas to identify areas of significant   asymmetry.  Algorithm to identify non-cortically generated artifact is used to   separate artifact from the EEG.    EEG FINDINGS:  Recording was obtained at the patient's bedside.  The patient was   awake at the onset.  Background was somewhat irregular and disorganized.  At   time an 8 to 9 Hz posterior dominant rhythm was present and a low voltage   irregular beta was seen diffusely.  This however, was frequency punctuated by   regular theta x2 Hz delta intermixed.  The patient was moving at times, but the   alpha did not seem to be related to the moving.  The patient experienced some   trembling of her legs and was talking to the technologist during this period.    There was no change in the electrocortical activity at that time.  John or   sharp wave activity was seen.  The patient was asked question and reported that   her location was [facility]; however, did not remember the name of the   president.  During the time that the patient was having some trembling of her   legs, she was able to talk normally.  Intermittent photic stimulation was   carried out, which did not alter the recording.  Her sleep was characterized by   generalized mixture of midrange theta with superimposed of both lower range and   mid range beta frequencies.  No lateralized or focal features were noted during   sleep.  The patient did not pass beyond light sleep.    IMPRESSION:  Abnormal EEG.  The background is disorganized and slow, indicating   the presence of a mild-to-moderate, nonfocal, and nonspecific encephalopathy.    There was a fair amount of beta activity present, which may represent medication   effect such as benzodiazepine.  The slowing; however, is nonspecific  and does   not suggest either a specific etiology.      RR/SN  dd: 06/28/2017 09:06:15 (CDT)  td: 07/12/2017 16:47:35 (CDT)  Doc ID   #[id number]  Job ID #[id number]    CC:

## 2017-07-13 LAB — BACTERIA THROAT CULT: NORMAL

## 2017-09-11 ENCOUNTER — LAB VISIT (OUTPATIENT)
Dept: LAB | Facility: HOSPITAL | Age: 57
End: 2017-09-11
Attending: FAMILY MEDICINE
Payer: MEDICAID

## 2017-09-11 DIAGNOSIS — R07.89 CHEST TIGHTNESS: Primary | ICD-10-CM

## 2017-09-11 LAB
FLUAV AG SPEC QL IA: NEGATIVE
FLUBV AG SPEC QL IA: NEGATIVE
SPECIMEN SOURCE: NORMAL

## 2017-09-11 PROCEDURE — 87400 INFLUENZA A/B EACH AG IA: CPT | Mod: 59

## 2017-09-18 ENCOUNTER — LAB VISIT (OUTPATIENT)
Dept: LAB | Facility: HOSPITAL | Age: 57
End: 2017-09-18
Attending: FAMILY MEDICINE
Payer: MEDICAID

## 2017-09-18 DIAGNOSIS — N39.0 UTI (URINARY TRACT INFECTION): Primary | ICD-10-CM

## 2017-09-18 LAB
BACTERIA #/AREA URNS HPF: ABNORMAL /HPF
BILIRUB UR QL STRIP: NEGATIVE
CLARITY UR: CLEAR
COLOR UR: YELLOW
GLUCOSE UR QL STRIP: ABNORMAL
HGB UR QL STRIP: ABNORMAL
KETONES UR QL STRIP: NEGATIVE
LEUKOCYTE ESTERASE UR QL STRIP: ABNORMAL
MICROSCOPIC COMMENT: ABNORMAL
NITRITE UR QL STRIP: NEGATIVE
PH UR STRIP: 6 [PH] (ref 5–8)
PROT UR QL STRIP: NEGATIVE
RBC #/AREA URNS HPF: 2 /HPF (ref 0–4)
SP GR UR STRIP: 1.01 (ref 1–1.03)
URN SPEC COLLECT METH UR: ABNORMAL
UROBILINOGEN UR STRIP-ACNC: NEGATIVE EU/DL
WBC #/AREA URNS HPF: 10 /HPF (ref 0–5)

## 2017-09-18 PROCEDURE — 87077 CULTURE AEROBIC IDENTIFY: CPT

## 2017-09-18 PROCEDURE — 81000 URINALYSIS NONAUTO W/SCOPE: CPT

## 2017-09-18 PROCEDURE — 87591 N.GONORRHOEAE DNA AMP PROB: CPT

## 2017-09-18 PROCEDURE — 87186 SC STD MICRODIL/AGAR DIL: CPT

## 2017-09-18 PROCEDURE — 87086 URINE CULTURE/COLONY COUNT: CPT

## 2017-09-18 PROCEDURE — 87088 URINE BACTERIA CULTURE: CPT

## 2017-09-19 LAB
C TRACH DNA SPEC QL NAA+PROBE: NOT DETECTED
N GONORRHOEA DNA SPEC QL NAA+PROBE: NOT DETECTED

## 2017-09-21 LAB — BACTERIA UR CULT: NORMAL

## 2017-09-29 ENCOUNTER — HOSPITAL ENCOUNTER (INPATIENT)
Facility: HOSPITAL | Age: 57
LOS: 2 days | Discharge: NURSING, REHAB OR LONG-TERM CARE | DRG: 682 | End: 2017-10-03
Attending: SURGERY | Admitting: FAMILY MEDICINE
Payer: MEDICAID

## 2017-09-29 DIAGNOSIS — R79.89 PRERENAL AZOTEMIA: ICD-10-CM

## 2017-09-29 DIAGNOSIS — R19.7 DIARRHEA, UNSPECIFIED TYPE: ICD-10-CM

## 2017-09-29 DIAGNOSIS — N39.0 URINARY TRACT INFECTION WITHOUT HEMATURIA, SITE UNSPECIFIED: ICD-10-CM

## 2017-09-29 DIAGNOSIS — R53.83 FATIGUE: ICD-10-CM

## 2017-09-29 DIAGNOSIS — N30.00 ACUTE CYSTITIS WITHOUT HEMATURIA: ICD-10-CM

## 2017-09-29 DIAGNOSIS — J18.9 HOSPITAL ACQUIRED PNA: ICD-10-CM

## 2017-09-29 DIAGNOSIS — S00.93XA CONTUSION OF HEAD, UNSPECIFIED PART OF HEAD, INITIAL ENCOUNTER: Primary | ICD-10-CM

## 2017-09-29 DIAGNOSIS — N17.9 ACUTE RENAL FAILURE, UNSPECIFIED ACUTE RENAL FAILURE TYPE: ICD-10-CM
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
## 2017-09-29 DIAGNOSIS — N17.9 ACUTE RENAL FAILURE: ICD-10-CM

## 2017-09-29 LAB
ALBUMIN SERPL BCP-MCNC: 3 G/DL
ALP SERPL-CCNC: 101 U/L
ALT SERPL W/O P-5'-P-CCNC: 17 U/L
ANION GAP SERPL CALC-SCNC: 16 MMOL/L
ANISOCYTOSIS BLD QL SMEAR: SLIGHT
AST SERPL-CCNC: 16 U/L
BASO STIPL BLD QL SMEAR: ABNORMAL
BASOPHILS NFR BLD: 0 %
BILIRUB SERPL-MCNC: 0.1 MG/DL
BUN SERPL-MCNC: 79 MG/DL
CALCIUM SERPL-MCNC: 9.5 MG/DL
CHLORIDE SERPL-SCNC: 95 MMOL/L
CO2 SERPL-SCNC: 29 MMOL/L
CREAT SERPL-MCNC: 2.2 MG/DL
DIFFERENTIAL METHOD: ABNORMAL
EOSINOPHIL NFR BLD: 3 %
ERYTHROCYTE [DISTWIDTH] IN BLOOD BY AUTOMATED COUNT: 15.2 %
EST. GFR  (AFRICAN AMERICAN): 27 ML/MIN/1.73 M^2
EST. GFR  (NON AFRICAN AMERICAN): 24 ML/MIN/1.73 M^2
GIANT PLATELETS BLD QL SMEAR: PRESENT
GLUCOSE SERPL-MCNC: 233 MG/DL
HCT VFR BLD AUTO: 35.1 %
HGB BLD-MCNC: 10.7 G/DL
HYPOCHROMIA BLD QL SMEAR: ABNORMAL
LYMPHOCYTES NFR BLD: 40 %
MCH RBC QN AUTO: 29.9 PG
MCHC RBC AUTO-ENTMCNC: 30.5 G/DL
MCV RBC AUTO: 98 FL
MONOCYTES NFR BLD: 10 %
NEUTROPHILS NFR BLD: 43 %
NEUTS BAND NFR BLD MANUAL: 3 %
PLATELET # BLD AUTO: 210 K/UL
PLATELET BLD QL SMEAR: ABNORMAL
PMV BLD AUTO: 13.5 FL
POLYCHROMASIA BLD QL SMEAR: ABNORMAL
POTASSIUM SERPL-SCNC: 5.5 MMOL/L
PROT SERPL-MCNC: 6.9 G/DL
RBC # BLD AUTO: 3.58 M/UL
SCHISTOCYTES BLD QL SMEAR: ABNORMAL
SODIUM SERPL-SCNC: 140 MMOL/L
SPHEROCYTES BLD QL SMEAR: ABNORMAL
WBC # BLD AUTO: 13.86 K/UL
WBC OTHER NFR BLD MANUAL: 1 %

## 2017-09-29 PROCEDURE — 84484 ASSAY OF TROPONIN QUANT: CPT

## 2017-09-29 PROCEDURE — 83880 ASSAY OF NATRIURETIC PEPTIDE: CPT

## 2017-09-29 PROCEDURE — 51702 INSERT TEMP BLADDER CATH: CPT

## 2017-09-29 PROCEDURE — 83735 ASSAY OF MAGNESIUM: CPT

## 2017-09-29 PROCEDURE — 85007 BL SMEAR W/DIFF WBC COUNT: CPT

## 2017-09-29 PROCEDURE — 96360 HYDRATION IV INFUSION INIT: CPT

## 2017-09-29 PROCEDURE — 36415 COLL VENOUS BLD VENIPUNCTURE: CPT

## 2017-09-29 PROCEDURE — 85027 COMPLETE CBC AUTOMATED: CPT

## 2017-09-29 PROCEDURE — 99285 EMERGENCY DEPT VISIT HI MDM: CPT | Mod: 25

## 2017-09-29 PROCEDURE — 93005 ELECTROCARDIOGRAM TRACING: CPT

## 2017-09-29 PROCEDURE — 82553 CREATINE MB FRACTION: CPT

## 2017-09-29 PROCEDURE — 11000001 HC ACUTE MED/SURG PRIVATE ROOM

## 2017-09-29 PROCEDURE — 80053 COMPREHEN METABOLIC PANEL: CPT

## 2017-09-29 PROCEDURE — 84100 ASSAY OF PHOSPHORUS: CPT

## 2017-09-29 PROCEDURE — 96361 HYDRATE IV INFUSION ADD-ON: CPT

## 2017-09-29 RX ORDER — SODIUM CHLORIDE 9 MG/ML
1000 INJECTION, SOLUTION INTRAVENOUS CONTINUOUS
Status: DISCONTINUED | OUTPATIENT
Start: 2017-09-30 | End: 2017-09-30

## 2017-09-29 RX ORDER — SODIUM CHLORIDE 9 MG/ML
1000 INJECTION, SOLUTION INTRAVENOUS
Status: COMPLETED | OUTPATIENT
Start: 2017-09-29 | End: 2017-09-30
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
## 2017-09-30 PROBLEM — N17.9 ACUTE RENAL FAILURE: Status: ACTIVE | Noted: 2017-09-30

## 2017-09-30 PROBLEM — S00.93XA CONTUSION OF HEAD: Status: ACTIVE | Noted: 2017-09-30

## 2017-09-30 PROBLEM — J18.9 HOSPITAL ACQUIRED PNA: Status: ACTIVE | Noted: 2017-09-30

## 2017-09-30 LAB
ALLENS TEST: ABNORMAL
AMMONIA PLAS-SCNC: 76 UMOL/L
ANION GAP SERPL CALC-SCNC: 12 MMOL/L
BACTERIA #/AREA URNS HPF: ABNORMAL /HPF
BILIRUB UR QL STRIP: NEGATIVE
BNP SERPL-MCNC: 12 PG/ML
BUN SERPL-MCNC: 71 MG/DL
CALCIUM SERPL-MCNC: 8.9 MG/DL
CHLORIDE SERPL-SCNC: 99 MMOL/L
CK MB SERPL-MCNC: 2.3 NG/ML
CK MB SERPL-MCNC: 2.7 NG/ML
CK MB SERPL-RTO: 1.4 %
CK MB SERPL-RTO: 2.1 %
CK SERPL-CCNC: 126 U/L
CK SERPL-CCNC: 126 U/L
CK SERPL-CCNC: 159 U/L
CK SERPL-CCNC: 159 U/L
CLARITY UR: CLEAR
CO2 SERPL-SCNC: 29 MMOL/L
COLOR UR: YELLOW
CREAT SERPL-MCNC: 1.7 MG/DL
DELSYS: ABNORMAL
EST. GFR  (AFRICAN AMERICAN): 38 ML/MIN/1.73 M^2
EST. GFR  (NON AFRICAN AMERICAN): 33 ML/MIN/1.73 M^2
ESTIMATED AVG GLUCOSE: 206 MG/DL
FIO2: 21 (ref 21–100)
GLUCOSE SERPL-MCNC: 259 MG/DL
GLUCOSE UR QL STRIP: ABNORMAL
HBA1C MFR BLD HPLC: 8.8 %
HCO3 UR-SCNC: 38.7 MEQ/L (ref 22–26)
HGB UR QL STRIP: ABNORMAL
KETONES UR QL STRIP: NEGATIVE
LEUKOCYTE ESTERASE UR QL STRIP: ABNORMAL
MAGNESIUM SERPL-MCNC: 1.9 MG/DL
MICROSCOPIC COMMENT: ABNORMAL
MODE: ABNORMAL
NITRITE UR QL STRIP: NEGATIVE
PCO2 BLDA: 61 MMHG (ref 35–45)
PH SMN: 7.41 [PH] (ref 7.35–7.45)
PH UR STRIP: 7 [PH] (ref 5–8)
PHOSPHATE SERPL-MCNC: 5.6 MG/DL
PO2 BLDA: 52 MMHG (ref 80–100)
POC BE: 11.7 MEQ/L (ref -2–2)
POC SATURATED O2: 87 % (ref 90–100)
POCT GLUCOSE: 260 MG/DL (ref 70–110)
POTASSIUM SERPL-SCNC: 5.1 MMOL/L
PROT UR QL STRIP: NEGATIVE
RBC #/AREA URNS HPF: 0 /HPF (ref 0–4)
SITE: ABNORMAL
SODIUM SERPL-SCNC: 140 MMOL/L
SP GR UR STRIP: 1.01 (ref 1–1.03)
TROPONIN I SERPL DL<=0.01 NG/ML-MCNC: 0.01 NG/ML
TROPONIN I SERPL DL<=0.01 NG/ML-MCNC: 0.01 NG/ML
TROPONIN I SERPL DL<=0.01 NG/ML-MCNC: 0.03 NG/ML
URN SPEC COLLECT METH UR: ABNORMAL
UROBILINOGEN UR STRIP-ACNC: NEGATIVE EU/DL
VALPROATE SERPL-MCNC: 78.1 UG/ML
WBC #/AREA URNS HPF: 5 /HPF (ref 0–5)

## 2017-09-30 PROCEDURE — 96375 TX/PRO/DX INJ NEW DRUG ADDON: CPT

## 2017-09-30 PROCEDURE — 82962 GLUCOSE BLOOD TEST: CPT

## 2017-09-30 PROCEDURE — 87086 URINE CULTURE/COLONY COUNT: CPT

## 2017-09-30 PROCEDURE — 96372 THER/PROPH/DIAG INJ SC/IM: CPT

## 2017-09-30 PROCEDURE — 99223 1ST HOSP IP/OBS HIGH 75: CPT | Mod: ,,, | Performed by: FAMILY MEDICINE

## 2017-09-30 PROCEDURE — 83036 HEMOGLOBIN GLYCOSYLATED A1C: CPT

## 2017-09-30 PROCEDURE — 82140 ASSAY OF AMMONIA: CPT

## 2017-09-30 PROCEDURE — 96367 TX/PROPH/DG ADDL SEQ IV INF: CPT

## 2017-09-30 PROCEDURE — 36600 WITHDRAWAL OF ARTERIAL BLOOD: CPT

## 2017-09-30 PROCEDURE — G0378 HOSPITAL OBSERVATION PER HR: HCPCS

## 2017-09-30 PROCEDURE — 99900035 HC TECH TIME PER 15 MIN (STAT)

## 2017-09-30 PROCEDURE — 94660 CPAP INITIATION&MGMT: CPT

## 2017-09-30 PROCEDURE — 84484 ASSAY OF TROPONIN QUANT: CPT | Mod: 91

## 2017-09-30 PROCEDURE — 27000492 HC SLEEVE, SCD T/L

## 2017-09-30 PROCEDURE — 25000003 PHARM REV CODE 250: Performed by: SURGERY

## 2017-09-30 PROCEDURE — 11000001 HC ACUTE MED/SURG PRIVATE ROOM

## 2017-09-30 PROCEDURE — 82553 CREATINE MB FRACTION: CPT

## 2017-09-30 PROCEDURE — 87077 CULTURE AEROBIC IDENTIFY: CPT

## 2017-09-30 PROCEDURE — 84484 ASSAY OF TROPONIN QUANT: CPT

## 2017-09-30 PROCEDURE — 27000190 HC CPAP FULL FACE MASK W/VALVE

## 2017-09-30 PROCEDURE — 63600175 PHARM REV CODE 636 W HCPCS: Performed by: FAMILY MEDICINE

## 2017-09-30 PROCEDURE — 82803 BLOOD GASES ANY COMBINATION: CPT | Performed by: FAMILY MEDICINE

## 2017-09-30 PROCEDURE — 36415 COLL VENOUS BLD VENIPUNCTURE: CPT

## 2017-09-30 PROCEDURE — 96366 THER/PROPH/DIAG IV INF ADDON: CPT

## 2017-09-30 PROCEDURE — 81000 URINALYSIS NONAUTO W/SCOPE: CPT

## 2017-09-30 PROCEDURE — 63600175 PHARM REV CODE 636 W HCPCS: Performed by: SURGERY

## 2017-09-30 PROCEDURE — 27000221 HC OXYGEN, UP TO 24 HOURS

## 2017-09-30 PROCEDURE — 87186 SC STD MICRODIL/AGAR DIL: CPT

## 2017-09-30 PROCEDURE — 87088 URINE BACTERIA CULTURE: CPT

## 2017-09-30 PROCEDURE — 96365 THER/PROPH/DIAG IV INF INIT: CPT

## 2017-09-30 PROCEDURE — 96361 HYDRATE IV INFUSION ADD-ON: CPT

## 2017-09-30 PROCEDURE — 80164 ASSAY DIPROPYLACETIC ACD TOT: CPT

## 2017-09-30 PROCEDURE — 80048 BASIC METABOLIC PNL TOTAL CA: CPT

## 2017-09-30 PROCEDURE — 25000003 PHARM REV CODE 250: Performed by: FAMILY MEDICINE

## 2017-09-30 PROCEDURE — 94761 N-INVAS EAR/PLS OXIMETRY MLT: CPT

## 2017-09-30 RX ORDER — BUSPIRONE HYDROCHLORIDE 10 MG/1
10 TABLET ORAL 3 TIMES DAILY
COMMUNITY
End: 2020-03-03

## 2017-09-30 RX ORDER — IBUPROFEN 200 MG
16 TABLET ORAL
Status: DISCONTINUED | OUTPATIENT
Start: 2017-09-30 | End: 2017-10-03 | Stop reason: HOSPADM

## 2017-09-30 RX ORDER — LEVOTHYROXINE SODIUM 100 UG/1
100 TABLET ORAL
Status: DISCONTINUED | OUTPATIENT
Start: 2017-10-01 | End: 2017-10-03 | Stop reason: HOSPADM

## 2017-09-30 RX ORDER — ASPIRIN 81 MG/1
81 TABLET ORAL DAILY
Status: DISCONTINUED | OUTPATIENT
Start: 2017-09-30 | End: 2017-10-03 | Stop reason: HOSPADM

## 2017-09-30 RX ORDER — ACETAMINOPHEN 325 MG/1
650 TABLET ORAL EVERY 8 HOURS PRN
Status: DISCONTINUED | OUTPATIENT
Start: 2017-09-30 | End: 2017-10-03 | Stop reason: HOSPADM

## 2017-09-30 RX ORDER — ONDANSETRON 2 MG/ML
4 INJECTION INTRAMUSCULAR; INTRAVENOUS EVERY 8 HOURS PRN
Status: DISCONTINUED | OUTPATIENT
Start: 2017-09-30 | End: 2017-10-03 | Stop reason: HOSPADM

## 2017-09-30 RX ORDER — DIVALPROEX SODIUM 500 MG/1
1000 TABLET, FILM COATED, EXTENDED RELEASE ORAL DAILY
Status: DISCONTINUED | OUTPATIENT
Start: 2017-09-30 | End: 2017-10-03 | Stop reason: HOSPADM

## 2017-09-30 RX ORDER — IBUPROFEN 200 MG
24 TABLET ORAL
Status: DISCONTINUED | OUTPATIENT
Start: 2017-09-30 | End: 2017-10-03 | Stop reason: HOSPADM

## 2017-09-30 RX ORDER — LISINOPRIL 10 MG/1
10 TABLET ORAL DAILY
Status: ON HOLD | COMMUNITY
End: 2018-10-17 | Stop reason: HOSPADM

## 2017-09-30 RX ORDER — CIPROFLOXACIN 2 MG/ML
400 INJECTION, SOLUTION INTRAVENOUS
Status: DISCONTINUED | OUTPATIENT
Start: 2017-09-30 | End: 2017-10-03 | Stop reason: HOSPADM

## 2017-09-30 RX ORDER — INSULIN ASPART 100 [IU]/ML
0-5 INJECTION, SOLUTION INTRAVENOUS; SUBCUTANEOUS
Status: DISCONTINUED | OUTPATIENT
Start: 2017-09-30 | End: 2017-10-03 | Stop reason: HOSPADM

## 2017-09-30 RX ORDER — FUROSEMIDE 10 MG/ML
20 INJECTION INTRAMUSCULAR; INTRAVENOUS ONCE
Status: COMPLETED | OUTPATIENT
Start: 2017-09-30 | End: 2017-09-30

## 2017-09-30 RX ORDER — ESCITALOPRAM OXALATE 10 MG/1
10 TABLET ORAL DAILY
Status: DISCONTINUED | OUTPATIENT
Start: 2017-09-30 | End: 2017-10-03 | Stop reason: HOSPADM

## 2017-09-30 RX ORDER — GLUCAGON 1 MG
1 KIT INJECTION
Status: DISCONTINUED | OUTPATIENT
Start: 2017-09-30 | End: 2017-10-03 | Stop reason: HOSPADM

## 2017-09-30 RX ORDER — BUSPIRONE HYDROCHLORIDE 10 MG/1
10 TABLET ORAL 3 TIMES DAILY
Status: DISCONTINUED | OUTPATIENT
Start: 2017-09-30 | End: 2017-10-03 | Stop reason: HOSPADM

## 2017-09-30 RX ORDER — PANTOPRAZOLE SODIUM 40 MG/1
40 TABLET, DELAYED RELEASE ORAL NIGHTLY
Status: DISCONTINUED | OUTPATIENT
Start: 2017-10-01 | End: 2017-10-03 | Stop reason: HOSPADM

## 2017-09-30 RX ORDER — SODIUM CHLORIDE 9 MG/ML
1000 INJECTION, SOLUTION INTRAVENOUS CONTINUOUS
Status: DISCONTINUED | OUTPATIENT
Start: 2017-09-30 | End: 2017-09-30

## 2017-09-30 RX ORDER — PANTOPRAZOLE SODIUM 40 MG/1
40 TABLET, DELAYED RELEASE ORAL DAILY
Status: DISCONTINUED | OUTPATIENT
Start: 2017-09-30 | End: 2017-09-30 | Stop reason: SDUPTHER

## 2017-09-30 RX ORDER — GABAPENTIN 300 MG/1
300 CAPSULE ORAL 3 TIMES DAILY
Status: DISCONTINUED | OUTPATIENT
Start: 2017-09-30 | End: 2017-10-03 | Stop reason: HOSPADM

## 2017-09-30 RX ORDER — METFORMIN HYDROCHLORIDE 500 MG/1
1000 TABLET ORAL 2 TIMES DAILY
Status: DISCONTINUED | OUTPATIENT
Start: 2017-09-30 | End: 2017-09-30

## 2017-09-30 RX ORDER — SODIUM CHLORIDE 9 MG/ML
INJECTION, SOLUTION INTRAVENOUS CONTINUOUS
Status: DISCONTINUED | OUTPATIENT
Start: 2017-09-30 | End: 2017-09-30

## 2017-09-30 RX ADMIN — SODIUM CHLORIDE 1000 ML: 0.9 INJECTION, SOLUTION INTRAVENOUS at 05:09

## 2017-09-30 RX ADMIN — PIPERACILLIN AND TAZOBACTAM 3.38 G: 3; .375 INJECTION, POWDER, LYOPHILIZED, FOR SOLUTION INTRAVENOUS; PARENTERAL at 06:09

## 2017-09-30 RX ADMIN — SITAGLIPTIN 100 MG: 50 TABLET, FILM COATED ORAL at 03:09

## 2017-09-30 RX ADMIN — BUSPIRONE HYDROCHLORIDE 10 MG: 10 TABLET ORAL at 03:09

## 2017-09-30 RX ADMIN — PANTOPRAZOLE SODIUM 40 MG: 40 TABLET, DELAYED RELEASE ORAL at 10:09

## 2017-09-30 RX ADMIN — SODIUM CHLORIDE 1000 ML: 0.9 INJECTION, SOLUTION INTRAVENOUS at 01:09

## 2017-09-30 RX ADMIN — BUSPIRONE HYDROCHLORIDE 10 MG: 10 TABLET ORAL at 09:09

## 2017-09-30 RX ADMIN — DIVALPROEX SODIUM 1000 MG: 500 TABLET, FILM COATED, EXTENDED RELEASE ORAL at 10:09

## 2017-09-30 RX ADMIN — SODIUM CHLORIDE 1000 ML: 0.9 INJECTION, SOLUTION INTRAVENOUS at 12:09

## 2017-09-30 RX ADMIN — INSULIN ASPART 3 UNITS: 100 INJECTION, SOLUTION INTRAVENOUS; SUBCUTANEOUS at 04:09

## 2017-09-30 RX ADMIN — ESCITALOPRAM 10 MG: 10 TABLET, FILM COATED ORAL at 10:09

## 2017-09-30 RX ADMIN — ASPIRIN 81 MG: 81 TABLET ORAL at 10:09

## 2017-09-30 RX ADMIN — CIPROFLOXACIN 400 MG: 2 INJECTION, SOLUTION INTRAVENOUS at 09:09

## 2017-09-30 RX ADMIN — FUROSEMIDE 20 MG: 10 INJECTION, SOLUTION INTRAMUSCULAR; INTRAVENOUS at 03:09

## 2017-09-30 RX ADMIN — INSULIN ASPART 1 UNITS: 100 INJECTION, SOLUTION INTRAVENOUS; SUBCUTANEOUS at 09:09

## 2017-09-30 RX ADMIN — VANCOMYCIN HYDROCHLORIDE 1500 MG: 1 INJECTION, POWDER, LYOPHILIZED, FOR SOLUTION INTRAVENOUS at 04:09

## 2017-09-30 RX ADMIN — SODIUM CHLORIDE: 0.9 INJECTION, SOLUTION INTRAVENOUS at 10:09

## 2017-09-30 RX ADMIN — METFORMIN HYDROCHLORIDE 1000 MG: 500 TABLET ORAL at 10:09

## 2017-09-30 RX ADMIN — GABAPENTIN 300 MG: 300 CAPSULE ORAL at 03:09

## 2017-09-30 RX ADMIN — GABAPENTIN 300 MG: 300 CAPSULE ORAL at 09:09

## 2017-09-30 NOTE — ED NOTES
The patient is asleep. Airway is open and patent, respirations are spontaneous, normal respiratory effort and rate noted, skin warm and dry, full ROM in all extremities, appearance: no apparent distress noted, resting comfortably.  VSS, no change from previous assessment.  Bed in low, locked position, SR x2, HOB 30 degrees.  Pt able to change position independently.  Call bell within reach of pt.  Will continue to monitor.

## 2017-09-30 NOTE — PROGRESS NOTES
"                                                Connor Catheter Insertion Checklist    This checklist is not a part of the patient's medical record. This form should be completed on every urinary catheter inserted within the facility. After line insertion & form completion, please immediately forward this form to infection control.      Insertion Date:17                     Insertion Time: 08           Unit: [facility] EMERGENCY DEPARTMENT    Patient Name: Michelle Godfrey            Patient : 1960  Patient MRN: [id number]  Patient Age: 57 y.o.                 Patient CSN: [id number]    Inserted By: Danish Cespedes           Removal Time   Removed By  Removal Reason:     Prior to the Procedure, did the   [x] Yes  [] No   1. Determine the clinical reason for the insertion of connor?  [x] Yes  [] No   2. Practice proper hand hygiene prior to insertion?  [x] Yes  [] No   3. Prep the meatus with Betadine/Betasept and allow appropriate drying time?  [x] Yes  [] No   4. Provide privacy to patient?  [x] Yes  [] No   5. Provide patient education, including reason for connor insertion and Procedure?    During the Procedure, did the :  [x] Yes  [] No   1. Use sterile gloves and appropriate PPE?  [x] Yes  [] No   2. Maintain sterile technique throughout?  [] Yes  [x] No   3. Encounter any complications?    Post-Procedure did the  (or designee)  [x] Yes  [] No   1. Check urine output for color, clarity, and amount  [x] Yes  [] No   2. Secure the connor catheter?  [x] Yes  [] No   3. Position connor below level of bladder but not touching floor?  [x] Yes  [] No   4. Perform perineal care per policy?  [x] Yes  [] No   5. Place the connor securely after the surgical procedure:                                Time: 820             Secured by: Danish Cespedes      Please explain any "NO" answers at the bottom of this note!      Valid clinical reasons for indwelling urinary catheterization: Please check appropriate " reasons below:    [x] Closely monitor urinary output (hemodynamic monitoring)  [] Open wound in sacral or perineal area and has urinary incontinence  [] Patient is too ill or fatigued to use any other type of urinary collection  [] Patient had recent surgery (i.e. Within 24-48 hrs)  [] Management of urinary incontinence on patient's request  [] Other (please specify)
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
## 2017-09-30 NOTE — ASSESSMENT & PLAN NOTE
Chronic renal failure - Stage II, secondary to diabetes/htn likely.  At this time, she is markedly dehydrated with prerenal azotemia at play.  Has gotten fluids with minimal improvement.  Avoid nephrotoxic meds.  Check daily BMP.
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
## 2017-09-30 NOTE — ASSESSMENT & PLAN NOTE
Chronic issue.  Infiltrate on XR noted.  Will treat with Cipro for now.  Afebrile, normal WBC ct.  No add'l steroid needed. Cont nebs.
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
## 2017-09-30 NOTE — HOSPITAL COURSE
While being evaluated for the fall, patient was found to have diarrhea. She had sevearl stools in the ER. She was noted to be markedly dehydrated in comparison to her baseline with some renal impairment. She was slowly hydrated in the ER, but after continued renal depression, it was decided to keep her for observation and treatment of her renal insufficiency. It should be noted that patient is not a good historian and history was obtained from NH. She is somnolent and hard to obtain a history from.    This morning, patient is much more awake. She feels better and can give better history. She has been having dysuria and trouble breathing for a couple of weeks. She felt acutely weak on Thursday and fell Friday. She is only complaining of pain in her head this morning where she hit it.    10/2/17: renal function improved. She is being treated for UTI with Vanc and Zosyn. Culture is pending. No fevers, No leukocytosis this AM. She is alert and oritned. Wants her Norco back to Q6H for her chronic back pain.     10/3/17:no fevers or leukocytosis. Urine cx GNR. She is tolerating Norco for her back pain. Has not ambulated yet but will do therapy at NH after discharge.

## 2017-09-30 NOTE — ASSESSMENT & PLAN NOTE
Treat for MRSA pna with infiltrate, elevated WBC ct, altered mental status.    Vanc, zosyn and cipro

## 2017-09-30 NOTE — NURSING
Peripheral IV infiltrated; after several attempts by multiple nurses, only able to get 22 gauge in right wrist. Multiple sticks by lab for necessary blood work. Pt to be treated for hospital acquired pneumonia and need for several antibiotics.  Dr. Campbell spoke with Dr. Mendoza for insertion of central line. Patient still very lethargic but able to arouse for short periods. Explained need for central line to pt; verbally consents but has spastic jerking and unable to sign. Tried to contact pts daughter and sister several times but unsuccessful.

## 2017-09-30 NOTE — ED NOTES
The patient is awake, alert and cooperative with a calm affect, patient is aware of environment. Airway is open and patent, respirations are spontaneous, normal respiratory effort and rate noted, skin warm and dry, full ROM in all extremities, appearance: no apparent distress noted, resting comfortably.  VSS, no change from previous assessment.  Bed in low, locked position, SR x2, HOB 30 degrees.  Pt able to change position independently.  Call bell within reach of pt.  Pt verbalizes understanding of use.  Will continue to monitor.

## 2017-10-01 PROBLEM — N39.0 UTI (URINARY TRACT INFECTION): Status: ACTIVE | Noted: 2017-10-01

## 2017-10-01 LAB
ALBUMIN SERPL BCP-MCNC: 2.5 G/DL
ALP SERPL-CCNC: 61 U/L
ALT SERPL W/O P-5'-P-CCNC: 12 U/L
ANION GAP SERPL CALC-SCNC: 6 MMOL/L
AST SERPL-CCNC: 12 U/L
BASOPHILS # BLD AUTO: 0.02 K/UL
BASOPHILS NFR BLD: 0.2 %
BILIRUB SERPL-MCNC: 0.2 MG/DL
BUN SERPL-MCNC: 32 MG/DL
CALCIUM SERPL-MCNC: 9 MG/DL
CHLORIDE SERPL-SCNC: 100 MMOL/L
CO2 SERPL-SCNC: 36 MMOL/L
CREAT SERPL-MCNC: 0.9 MG/DL
DIFFERENTIAL METHOD: ABNORMAL
EOSINOPHIL # BLD AUTO: 0.1 K/UL
EOSINOPHIL NFR BLD: 0.7 %
ERYTHROCYTE [DISTWIDTH] IN BLOOD BY AUTOMATED COUNT: 14.9 %
EST. GFR  (AFRICAN AMERICAN): >60 ML/MIN/1.73 M^2
EST. GFR  (NON AFRICAN AMERICAN): >60 ML/MIN/1.73 M^2
GLUCOSE SERPL-MCNC: 250 MG/DL
HCT VFR BLD AUTO: 34.5 %
HGB BLD-MCNC: 10.4 G/DL
LYMPHOCYTES # BLD AUTO: 3.5 K/UL
LYMPHOCYTES NFR BLD: 35.8 %
MCH RBC QN AUTO: 29.8 PG
MCHC RBC AUTO-ENTMCNC: 30.1 G/DL
MCV RBC AUTO: 99 FL
MONOCYTES # BLD AUTO: 1.2 K/UL
MONOCYTES NFR BLD: 12.1 %
NEUTROPHILS # BLD AUTO: 5 K/UL
NEUTROPHILS NFR BLD: 51.2 %
PLATELET # BLD AUTO: 208 K/UL
PMV BLD AUTO: 13.6 FL
POCT GLUCOSE: 207 MG/DL (ref 70–110)
POCT GLUCOSE: 252 MG/DL (ref 70–110)
POCT GLUCOSE: 258 MG/DL (ref 70–110)
POCT GLUCOSE: 315 MG/DL (ref 70–110)
POTASSIUM SERPL-SCNC: 5 MMOL/L
PROT SERPL-MCNC: 6 G/DL
RBC # BLD AUTO: 3.49 M/UL
SODIUM SERPL-SCNC: 142 MMOL/L
WBC # BLD AUTO: 9.76 K/UL

## 2017-10-01 PROCEDURE — 96366 THER/PROPH/DIAG IV INF ADDON: CPT

## 2017-10-01 PROCEDURE — 36415 COLL VENOUS BLD VENIPUNCTURE: CPT

## 2017-10-01 PROCEDURE — 97530 THERAPEUTIC ACTIVITIES: CPT

## 2017-10-01 PROCEDURE — 63600175 PHARM REV CODE 636 W HCPCS: Performed by: SURGERY

## 2017-10-01 PROCEDURE — 94640 AIRWAY INHALATION TREATMENT: CPT

## 2017-10-01 PROCEDURE — 85025 COMPLETE CBC W/AUTO DIFF WBC: CPT

## 2017-10-01 PROCEDURE — G8978 MOBILITY CURRENT STATUS: HCPCS | Mod: CM

## 2017-10-01 PROCEDURE — 82962 GLUCOSE BLOOD TEST: CPT

## 2017-10-01 PROCEDURE — 94761 N-INVAS EAR/PLS OXIMETRY MLT: CPT

## 2017-10-01 PROCEDURE — 25000242 PHARM REV CODE 250 ALT 637 W/ HCPCS: Performed by: FAMILY MEDICINE

## 2017-10-01 PROCEDURE — G8979 MOBILITY GOAL STATUS: HCPCS | Mod: CK

## 2017-10-01 PROCEDURE — 97162 PT EVAL MOD COMPLEX 30 MIN: CPT

## 2017-10-01 PROCEDURE — 94660 CPAP INITIATION&MGMT: CPT

## 2017-10-01 PROCEDURE — 25000003 PHARM REV CODE 250: Performed by: FAMILY MEDICINE

## 2017-10-01 PROCEDURE — 80053 COMPREHEN METABOLIC PANEL: CPT

## 2017-10-01 PROCEDURE — 25000003 PHARM REV CODE 250: Performed by: SURGERY

## 2017-10-01 PROCEDURE — 27000221 HC OXYGEN, UP TO 24 HOURS

## 2017-10-01 PROCEDURE — 11000001 HC ACUTE MED/SURG PRIVATE ROOM

## 2017-10-01 PROCEDURE — 96372 THER/PROPH/DIAG INJ SC/IM: CPT

## 2017-10-01 PROCEDURE — 99900035 HC TECH TIME PER 15 MIN (STAT)

## 2017-10-01 PROCEDURE — 63600175 PHARM REV CODE 636 W HCPCS: Performed by: FAMILY MEDICINE

## 2017-10-01 PROCEDURE — 99233 SBSQ HOSP IP/OBS HIGH 50: CPT | Mod: ,,, | Performed by: FAMILY MEDICINE

## 2017-10-01 RX ORDER — HYDROCODONE BITARTRATE AND ACETAMINOPHEN 5; 325 MG/1; MG/1
1 TABLET ORAL EVERY 8 HOURS PRN
Status: DISCONTINUED | OUTPATIENT
Start: 2017-10-01 | End: 2017-10-02

## 2017-10-01 RX ORDER — IPRATROPIUM BROMIDE AND ALBUTEROL SULFATE 2.5; .5 MG/3ML; MG/3ML
3 SOLUTION RESPIRATORY (INHALATION) EVERY 6 HOURS
Status: DISCONTINUED | OUTPATIENT
Start: 2017-10-01 | End: 2017-10-03 | Stop reason: HOSPADM

## 2017-10-01 RX ADMIN — VANCOMYCIN HYDROCHLORIDE 1500 MG: 1 INJECTION, POWDER, LYOPHILIZED, FOR SOLUTION INTRAVENOUS at 04:10

## 2017-10-01 RX ADMIN — BUSPIRONE HYDROCHLORIDE 10 MG: 10 TABLET ORAL at 01:10

## 2017-10-01 RX ADMIN — ESCITALOPRAM 10 MG: 10 TABLET, FILM COATED ORAL at 08:10

## 2017-10-01 RX ADMIN — LEVOTHYROXINE SODIUM 100 MCG: 100 TABLET ORAL at 06:10

## 2017-10-01 RX ADMIN — INSULIN ASPART 1 UNITS: 100 INJECTION, SOLUTION INTRAVENOUS; SUBCUTANEOUS at 09:10

## 2017-10-01 RX ADMIN — CIPROFLOXACIN 400 MG: 2 INJECTION, SOLUTION INTRAVENOUS at 09:10

## 2017-10-01 RX ADMIN — INSULIN ASPART 3 UNITS: 100 INJECTION, SOLUTION INTRAVENOUS; SUBCUTANEOUS at 05:10

## 2017-10-01 RX ADMIN — INSULIN DETEMIR 15 UNITS: 100 INJECTION, SOLUTION SUBCUTANEOUS at 09:10

## 2017-10-01 RX ADMIN — GABAPENTIN 300 MG: 300 CAPSULE ORAL at 06:10

## 2017-10-01 RX ADMIN — HYDROCODONE BITARTRATE AND ACETAMINOPHEN 1 TABLET: 5; 325 TABLET ORAL at 09:10

## 2017-10-01 RX ADMIN — GABAPENTIN 300 MG: 300 CAPSULE ORAL at 01:10

## 2017-10-01 RX ADMIN — INSULIN ASPART 3 UNITS: 100 INJECTION, SOLUTION INTRAVENOUS; SUBCUTANEOUS at 06:10

## 2017-10-01 RX ADMIN — PANTOPRAZOLE SODIUM 40 MG: 40 TABLET, DELAYED RELEASE ORAL at 09:10

## 2017-10-01 RX ADMIN — BUSPIRONE HYDROCHLORIDE 10 MG: 10 TABLET ORAL at 06:10

## 2017-10-01 RX ADMIN — BUSPIRONE HYDROCHLORIDE 10 MG: 10 TABLET ORAL at 09:10

## 2017-10-01 RX ADMIN — GABAPENTIN 300 MG: 300 CAPSULE ORAL at 09:10

## 2017-10-01 RX ADMIN — PIPERACILLIN AND TAZOBACTAM 3.38 G: 3; .375 INJECTION, POWDER, LYOPHILIZED, FOR SOLUTION INTRAVENOUS; PARENTERAL at 05:10

## 2017-10-01 RX ADMIN — PIPERACILLIN AND TAZOBACTAM 3.38 G: 3; .375 INJECTION, POWDER, LYOPHILIZED, FOR SOLUTION INTRAVENOUS; PARENTERAL at 12:10

## 2017-10-01 RX ADMIN — DIVALPROEX SODIUM 1000 MG: 500 TABLET, FILM COATED, EXTENDED RELEASE ORAL at 08:10

## 2017-10-01 RX ADMIN — ASPIRIN 81 MG: 81 TABLET ORAL at 08:10

## 2017-10-01 RX ADMIN — IPRATROPIUM BROMIDE AND ALBUTEROL SULFATE 3 ML: .5; 3 SOLUTION RESPIRATORY (INHALATION) at 06:10

## 2017-10-01 RX ADMIN — HYDROCODONE BITARTRATE AND ACETAMINOPHEN 1 TABLET: 5; 325 TABLET ORAL at 01:10

## 2017-10-01 RX ADMIN — PIPERACILLIN AND TAZOBACTAM 3.38 G: 3; .375 INJECTION, POWDER, LYOPHILIZED, FOR SOLUTION INTRAVENOUS; PARENTERAL at 08:10

## 2017-10-01 RX ADMIN — SITAGLIPTIN 100 MG: 50 TABLET, FILM COATED ORAL at 08:10

## 2017-10-01 RX ADMIN — IPRATROPIUM BROMIDE AND ALBUTEROL SULFATE 3 ML: .5; 3 SOLUTION RESPIRATORY (INHALATION) at 01:10

## 2017-10-01 RX ADMIN — INSULIN ASPART 4 UNITS: 100 INJECTION, SOLUTION INTRAVENOUS; SUBCUTANEOUS at 12:10
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
## 2017-10-01 NOTE — ASSESSMENT & PLAN NOTE
Check Ammonia and ABG.  CT of head normal.    Seemed to have been secondary to uncontrolled infection

## 2017-10-01 NOTE — SUBJECTIVE & OBJECTIVE
Interval History: more awake this morning    Review of Systems   Constitutional: Positive for fatigue. Negative for chills and fever.   HENT: Negative for congestion, ear pain, postnasal drip, rhinorrhea, sore throat and trouble swallowing.    Eyes: Negative for redness and itching.   Respiratory: Negative for cough, shortness of breath and wheezing.    Cardiovascular: Negative for chest pain and palpitations.   Gastrointestinal: Positive for abdominal pain. Negative for diarrhea, nausea and vomiting.   Genitourinary: Positive for dysuria. Negative for frequency.   Skin: Negative for rash.   Neurological: Positive for weakness and headaches.   Psychiatric/Behavioral: Negative for confusion.     Objective:     Vital Signs (Most Recent):  Temp: 99.1 °F (37.3 °C) (10/01/17 1139)  Pulse: 79 (10/01/17 1139)  Resp: 20 (10/01/17 1139)  BP: (!) 152/70 (10/01/17 1139)  SpO2: (!) 92 % (10/01/17 1139) Vital Signs (24h Range):  Temp:  [97 °F (36.1 °C)-99.1 °F (37.3 °C)] 99.1 °F (37.3 °C)  Pulse:  [78-89] 79  Resp:  [12-20] 20  SpO2:  [92 %-98 %] 92 %  BP: (121-152)/(58-70) 152/70     Weight: (!) 140.7 kg (310 lb 3 oz)  Body mass index is 50.07 kg/m².    Intake/Output Summary (Last 24 hours) at 10/01/17 1247  Last data filed at 10/01/17 1139   Gross per 24 hour   Intake             1220 ml   Output             2700 ml   Net            -1480 ml      Physical Exam   Constitutional: She is oriented to person, place, and time. She appears well-developed. No distress.   Morbidly obese   HENT:   Head: Normocephalic and atraumatic.   Hematoma to scalp   Eyes: Conjunctivae are normal. Pupils are equal, round, and reactive to light.   Neck: Normal range of motion. Neck supple. No thyromegaly present.   Cardiovascular: Normal rate, regular rhythm, normal heart sounds and intact distal pulses.    Pulmonary/Chest: Effort normal. No respiratory distress. She has wheezes.   Diffuse rhonchi/rales   Abdominal: Soft. Bowel sounds are normal.  There is no tenderness.   Musculoskeletal: Normal range of motion. She exhibits no edema.   Lymphadenopathy:     She has no cervical adenopathy.   Neurological: She is alert and oriented to person, place, and time.   Skin: Skin is warm and dry. No rash noted.   Hematoma to scalp without bleeding   Psychiatric: She has a normal mood and affect. Her behavior is normal.   Nursing note and vitals reviewed.      Significant Labs:   BMP:   Recent Labs  Lab 09/29/17  2253  10/01/17  0614   *  < > 250*     < > 142   K 5.5*  < > 5.0   CL 95  < > 100   CO2 29  < > 36*   BUN 79*  < > 32*   CREATININE 2.2*  < > 0.9   CALCIUM 9.5  < > 9.0   MG 1.9  --   --    < > = values in this interval not displayed.  CBC:   Recent Labs  Lab 09/29/17  2253 10/01/17  0614   WBC 13.86* 9.76   HGB 10.7* 10.4*   HCT 35.1* 34.5*    208       Significant Imaging: I have reviewed all pertinent imaging results/findings within the past 24 hours.

## 2017-10-01 NOTE — ASSESSMENT & PLAN NOTE
Continue home januvia.  Held metformin for renal function. Will restart  SSI ordered  Add Levemir 15 U.  A1c

## 2017-10-01 NOTE — PLAN OF CARE
Problem: Patient Care Overview  Goal: Plan of Care Review  Outcome: Ongoing (interventions implemented as appropriate)  Patient slept on and off. Sometimes seems to be talking non-sense and reaching into air or fooling with Bipap mask. Attempted multiple times to reorient and calm patient to rest. E-Sitter in use. Tolerating IV antibiotics. Vanc currently infusing. SR on telemetry. NS at 25 cc/hr to keep vein open and IV site wrapped to protect site. Will continue to monitor.

## 2017-10-01 NOTE — PLAN OF CARE
Problem: Patient Care Overview  Goal: Plan of Care Review  Outcome: Ongoing (interventions implemented as appropriate)  Pt admitted yesterday with acute renal failure and contusion to head after fall at NH. Much more alert and oriented today. Complains of pain to hematoma on head; PRN pain medication and positioning helpful. CXR shows possible pneumonia. Remains on vancomycin, cipro and zosyn. Breath sounds diminished with occasional expiratory wheezing. Oxygen sat mid 90's on nasal cannula during the day. Bipap at night. Scheduled respiratory treatments. Creatinine down to 0.9 today. Blood sugars elevated; covering with scheduled and sliding scale insulin. Holding metformin since admit. WBC down to 9 today. Pratt catheter remains in place. Sinus rhythm on telemetry. E siiter in place and bed alarm on. Turning every 2 hours. PT consulted for eval. Call bell within reach. Reviewed plan of care with pt, states agreement.
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
## 2017-10-01 NOTE — ASSESSMENT & PLAN NOTE
About at baseline from a hypercapneic standpoint.  However, will add in nebs and treat for minimally worsened hypoxia.    Patinet with diffuse wheezing. Will add in scheduled nebs and continue abx.

## 2017-10-01 NOTE — ASSESSMENT & PLAN NOTE
"Reviewed last UCx - resis to cipro. Patient was on levaquin.  UCx pending currently.    On Vanc, zosyn, cipro.      13 days ago:  Urine Culture, Routine ESCHERICHIA COLI> 100,000 cfu/ml    Resulting Agency [facility]   Susceptibility      Escherichia coli     CULTURE, URINE     Amox/K Clav'ate 16/8  Intermediate     Amp/Sulbactam >16/8  Resistant     Ampicillin >16  Resistant     Cefazolin <=8 "><=8  Sensitive     Cefepime <=8 "><=8  Sensitive     Ceftriaxone <=8 "><=8  Sensitive     Ciprofloxacin >2  Resistant     Ertapenem <=2 "><=2  Sensitive     Gentamicin <=4 "><=4  Sensitive     Meropenem <=4 "><=4  Sensitive     Nitrofurantoin <=32 "><=32  Sensitive     Piperacillin/Tazo <=16 "><=16  Sensitive     Tetracycline <=4 "><=4  Sensitive     Tobramycin <=4 "><=4  Sensitive     Trimeth/Sulfa <=2/38 "><=2/38  Sensitive              Specimen Collected: 09/18/17 13:00 Last Resulted: 09/21/17 11:51          "

## 2017-10-01 NOTE — ASSESSMENT & PLAN NOTE
Chronic renal failure - Stage II, secondary to diabetes/htn likely.  At this time, she is markedly dehydrated with prerenal azotemia at play.  Has gotten fluids with minimal improvement.  Avoid nephrotoxic meds.  Check daily BMP - now resolved and back to baseline.    UA noted - will follow cultures.

## 2017-10-01 NOTE — ASSESSMENT & PLAN NOTE
Chronic issue.  Infiltrate on XR noted.  Will treat with Cipro for now.  Afebrile, normal WBC ct.  No add'l steroid needed. Cont scheduled nebs.

## 2017-10-02 LAB
ANION GAP SERPL CALC-SCNC: 7 MMOL/L
BASOPHILS # BLD AUTO: 0.02 K/UL
BASOPHILS NFR BLD: 0.2 %
BUN SERPL-MCNC: 24 MG/DL
CALCIUM SERPL-MCNC: 9.5 MG/DL
CHLORIDE SERPL-SCNC: 99 MMOL/L
CO2 SERPL-SCNC: 32 MMOL/L
CREAT SERPL-MCNC: 0.9 MG/DL
DIFFERENTIAL METHOD: ABNORMAL
EOSINOPHIL # BLD AUTO: 0.3 K/UL
EOSINOPHIL NFR BLD: 3.1 %
ERYTHROCYTE [DISTWIDTH] IN BLOOD BY AUTOMATED COUNT: 14.7 %
EST. GFR  (AFRICAN AMERICAN): >60 ML/MIN/1.73 M^2
EST. GFR  (NON AFRICAN AMERICAN): >60 ML/MIN/1.73 M^2
GLUCOSE SERPL-MCNC: 205 MG/DL
HCT VFR BLD AUTO: 34 %
HGB BLD-MCNC: 10.5 G/DL
LYMPHOCYTES # BLD AUTO: 5 K/UL
LYMPHOCYTES NFR BLD: 45.3 %
MCH RBC QN AUTO: 30.2 PG
MCHC RBC AUTO-ENTMCNC: 30.9 G/DL
MCV RBC AUTO: 98 FL
MONOCYTES # BLD AUTO: 1.8 K/UL
MONOCYTES NFR BLD: 16.4 %
NEUTROPHILS # BLD AUTO: 3.9 K/UL
NEUTROPHILS NFR BLD: 35.5 %
PATH REV BLD -IMP: NORMAL
PLATELET # BLD AUTO: 195 K/UL
PMV BLD AUTO: 13.5 FL
POCT GLUCOSE: 210 MG/DL (ref 70–110)
POCT GLUCOSE: 272 MG/DL (ref 70–110)
POCT GLUCOSE: 291 MG/DL (ref 70–110)
POCT GLUCOSE: 302 MG/DL (ref 70–110)
POCT GLUCOSE: 319 MG/DL (ref 70–110)
POTASSIUM SERPL-SCNC: 5.6 MMOL/L
RBC # BLD AUTO: 3.48 M/UL
SODIUM SERPL-SCNC: 138 MMOL/L
VANCOMYCIN TROUGH SERPL-MCNC: 15.7 UG/ML
WBC # BLD AUTO: 11.07 K/UL

## 2017-10-02 PROCEDURE — 94761 N-INVAS EAR/PLS OXIMETRY MLT: CPT

## 2017-10-02 PROCEDURE — 97110 THERAPEUTIC EXERCISES: CPT

## 2017-10-02 PROCEDURE — 97530 THERAPEUTIC ACTIVITIES: CPT

## 2017-10-02 PROCEDURE — 25000003 PHARM REV CODE 250: Performed by: SURGERY

## 2017-10-02 PROCEDURE — 27000221 HC OXYGEN, UP TO 24 HOURS

## 2017-10-02 PROCEDURE — 80048 BASIC METABOLIC PNL TOTAL CA: CPT

## 2017-10-02 PROCEDURE — 11000001 HC ACUTE MED/SURG PRIVATE ROOM

## 2017-10-02 PROCEDURE — 36415 COLL VENOUS BLD VENIPUNCTURE: CPT

## 2017-10-02 PROCEDURE — 25000242 PHARM REV CODE 250 ALT 637 W/ HCPCS: Performed by: FAMILY MEDICINE

## 2017-10-02 PROCEDURE — 25000003 PHARM REV CODE 250: Performed by: INTERNAL MEDICINE

## 2017-10-02 PROCEDURE — 80202 ASSAY OF VANCOMYCIN: CPT

## 2017-10-02 PROCEDURE — 25000003 PHARM REV CODE 250: Performed by: FAMILY MEDICINE

## 2017-10-02 PROCEDURE — 99900035 HC TECH TIME PER 15 MIN (STAT)

## 2017-10-02 PROCEDURE — 94660 CPAP INITIATION&MGMT: CPT

## 2017-10-02 PROCEDURE — 82962 GLUCOSE BLOOD TEST: CPT

## 2017-10-02 PROCEDURE — 63600175 PHARM REV CODE 636 W HCPCS: Performed by: SURGERY

## 2017-10-02 PROCEDURE — 99232 SBSQ HOSP IP/OBS MODERATE 35: CPT | Mod: ,,, | Performed by: INTERNAL MEDICINE

## 2017-10-02 PROCEDURE — 85025 COMPLETE CBC W/AUTO DIFF WBC: CPT

## 2017-10-02 PROCEDURE — 94640 AIRWAY INHALATION TREATMENT: CPT

## 2017-10-02 PROCEDURE — 63600175 PHARM REV CODE 636 W HCPCS: Performed by: FAMILY MEDICINE

## 2017-10-02 RX ORDER — METFORMIN HYDROCHLORIDE 500 MG/1
1000 TABLET ORAL 2 TIMES DAILY WITH MEALS
Status: DISCONTINUED | OUTPATIENT
Start: 2017-10-02 | End: 2017-10-03 | Stop reason: HOSPADM

## 2017-10-02 RX ORDER — HYDROCODONE BITARTRATE AND ACETAMINOPHEN 5; 325 MG/1; MG/1
1 TABLET ORAL EVERY 6 HOURS PRN
Status: DISCONTINUED | OUTPATIENT
Start: 2017-10-02 | End: 2017-10-03 | Stop reason: HOSPADM

## 2017-10-02 RX ADMIN — HYDROCODONE BITARTRATE AND ACETAMINOPHEN 1 TABLET: 5; 325 TABLET ORAL at 11:10

## 2017-10-02 RX ADMIN — INSULIN DETEMIR 15 UNITS: 100 INJECTION, SOLUTION SUBCUTANEOUS at 08:10

## 2017-10-02 RX ADMIN — LEVOTHYROXINE SODIUM 100 MCG: 100 TABLET ORAL at 06:10

## 2017-10-02 RX ADMIN — IPRATROPIUM BROMIDE AND ALBUTEROL SULFATE 3 ML: .5; 3 SOLUTION RESPIRATORY (INHALATION) at 12:10

## 2017-10-02 RX ADMIN — ESCITALOPRAM 10 MG: 10 TABLET, FILM COATED ORAL at 09:10

## 2017-10-02 RX ADMIN — METFORMIN HYDROCHLORIDE 1000 MG: 500 TABLET ORAL at 04:10

## 2017-10-02 RX ADMIN — GABAPENTIN 300 MG: 300 CAPSULE ORAL at 06:10

## 2017-10-02 RX ADMIN — GABAPENTIN 300 MG: 300 CAPSULE ORAL at 04:10

## 2017-10-02 RX ADMIN — HYDROCODONE BITARTRATE AND ACETAMINOPHEN 1 TABLET: 5; 325 TABLET ORAL at 09:10

## 2017-10-02 RX ADMIN — PIPERACILLIN AND TAZOBACTAM 3.38 G: 3; .375 INJECTION, POWDER, LYOPHILIZED, FOR SOLUTION INTRAVENOUS; PARENTERAL at 09:10

## 2017-10-02 RX ADMIN — PIPERACILLIN AND TAZOBACTAM 3.38 G: 3; .375 INJECTION, POWDER, LYOPHILIZED, FOR SOLUTION INTRAVENOUS; PARENTERAL at 12:10

## 2017-10-02 RX ADMIN — SITAGLIPTIN 100 MG: 50 TABLET, FILM COATED ORAL at 09:10

## 2017-10-02 RX ADMIN — VANCOMYCIN HYDROCHLORIDE 1500 MG: 1 INJECTION, POWDER, LYOPHILIZED, FOR SOLUTION INTRAVENOUS at 04:10

## 2017-10-02 RX ADMIN — HYDROCODONE BITARTRATE AND ACETAMINOPHEN 1 TABLET: 5; 325 TABLET ORAL at 06:10

## 2017-10-02 RX ADMIN — BUSPIRONE HYDROCHLORIDE 10 MG: 10 TABLET ORAL at 06:10

## 2017-10-02 RX ADMIN — IPRATROPIUM BROMIDE AND ALBUTEROL SULFATE 3 ML: .5; 3 SOLUTION RESPIRATORY (INHALATION) at 06:10

## 2017-10-02 RX ADMIN — INSULIN ASPART 4 UNITS: 100 INJECTION, SOLUTION INTRAVENOUS; SUBCUTANEOUS at 11:10

## 2017-10-02 RX ADMIN — ASPIRIN 81 MG: 81 TABLET ORAL at 09:10

## 2017-10-02 RX ADMIN — BUSPIRONE HYDROCHLORIDE 10 MG: 10 TABLET ORAL at 04:10

## 2017-10-02 RX ADMIN — CIPROFLOXACIN 400 MG: 2 INJECTION, SOLUTION INTRAVENOUS at 11:10

## 2017-10-02 RX ADMIN — DIVALPROEX SODIUM 1000 MG: 500 TABLET, FILM COATED, EXTENDED RELEASE ORAL at 09:10

## 2017-10-02 RX ADMIN — PIPERACILLIN AND TAZOBACTAM 3.38 G: 3; .375 INJECTION, POWDER, LYOPHILIZED, FOR SOLUTION INTRAVENOUS; PARENTERAL at 07:10

## 2017-10-02 RX ADMIN — CIPROFLOXACIN 400 MG: 2 INJECTION, SOLUTION INTRAVENOUS at 09:10

## 2017-10-02 RX ADMIN — INSULIN ASPART 2 UNITS: 100 INJECTION, SOLUTION INTRAVENOUS; SUBCUTANEOUS at 09:10

## 2017-10-02 RX ADMIN — GABAPENTIN 300 MG: 300 CAPSULE ORAL at 09:10

## 2017-10-02 RX ADMIN — INSULIN ASPART 3 UNITS: 100 INJECTION, SOLUTION INTRAVENOUS; SUBCUTANEOUS at 04:10

## 2017-10-02 RX ADMIN — PANTOPRAZOLE SODIUM 40 MG: 40 TABLET, DELAYED RELEASE ORAL at 09:10

## 2017-10-02 RX ADMIN — INSULIN ASPART 2 UNITS: 100 INJECTION, SOLUTION INTRAVENOUS; SUBCUTANEOUS at 07:10

## 2017-10-02 RX ADMIN — BUSPIRONE HYDROCHLORIDE 10 MG: 10 TABLET ORAL at 09:10

## 2017-10-02 NOTE — PROGRESS NOTES
Staff Handoff    Report received from Rosaline RN and patient assessed per flowsheet. 2 liters N/C in use. SR on telemetry. Pratt to gravity, clear yellow urine. Bipap at bedside. Instructed to call for needs/asst. POC reviewed.  Resident Handoff

## 2017-10-02 NOTE — PLAN OF CARE
10/02/17 1103   Discharge Assessment   Assessment Type Discharge Planning Assessment   Confirmed/corrected address and phone number on facesheet? Yes   Assessment information obtained from? Patient;Medical Record   Expected Length of Stay (days) 2   Communicated expected length of stay with patient/caregiver yes   Prior to hospitilization cognitive status: Alert/Oriented   Prior to hospitalization functional status: Assistive Equipment   Current cognitive status: Alert/Oriented   Current Functional Status: Independent   Facility Arrived From: [facility]   Lives With facility resident   Able to Return to Prior Arrangements yes   Is patient able to care for self after discharge? Yes   Who are your caregiver(s) and their phone number(s)? Kathie Cho [phone]   Patient's perception of discharge disposition home or selfcare   Readmission Within The Last 30 Days no previous admission in last 30 days   Patient currently being followed by outpatient case management? No   Patient currently receives any other outside agency services? No   Equipment Currently Used at Home walker, rolling   Do you have any problems affording any of your prescribed medications? No   Is the patient taking medications as prescribed? yes   Does the patient have transportation home? Yes   Transportation Available agency transportation   Does the patient receive services at the [facility]? No   Discharge Plan A Return to nursing home   Discharge Plan B Return to Nursing Home   Patient/Family In Agreement With Plan yes     The pt is a 57 year old female who was admitted with fatigue. The pt is a resident at [facility]. No other  needs noted at this time. SW will continue to follow and offer support as needed.    Tia Lama LMSW

## 2017-10-02 NOTE — PT/OT/SLP PROGRESS
Physical Therapy  Treatment    Michelle Godfrey   MRN: [id number]   Admitting Diagnosis: Acute renal failure    PT Received On: 10/02/17  PT Start Time: 1335     PT Stop Time: 1400    PT Total Time (min): 25 min       Billable Minutes:  Therapeutic Activity 15 minutes and Therapeutic Exercise 10 minutes    Treatment Type: Treatment  PT/PTA: PT             General Precautions: Standard, fall  Orthopedic Precautions: N/A   Braces:           Subjective:  Communicated with patient prior to session.    Pain/Comfort  Pain Rating 1: 6/10  Location - Orientation 1: generalized  Location 1: back  Pain Addressed 1: Nurse notified  Pain Rating Post-Intervention 1: 6/10    Objective:   Patient found with: bed alarm, connor catheter, telemetry, SCD    Functional Mobility:  Bed Mobility:   Rolling/Turning to Left: Minimum assistance  Rolling/Turning Right: Minimum assistance  Scooting/Bridging: Minimum Assistance  Supine to Sit: Minimum Assistance  Sit to Supine: Minimum Assistance    Balance:   Static Sit: POOR+: Needs MINIMAL assist to maintain  Dynamic Sit: POOR: N/A     Therapeutic Activities and Exercises:  Pt performed therapeutic exercises on BLE supine in bed at all joints in available planes of motion with rest breaks as needed to increase strength and endurance with all gross mobility skills.     AM-PAC 6 CLICK MOBILITY  How much help from another person does this patient currently need?   1 = Unable, Total/Dependent Assistance  2 = A lot, Maximum/Moderate Assistance  3 = A little, Minimum/Contact Guard/Supervision  4 = None, Modified [place]/Independent    Turning over in bed (including adjusting bedclothes, sheets and blankets)?: 3  Sitting down on and standing up from a chair with arms (e.g., wheelchair, bedside commode, etc.): 1  Moving from lying on back to sitting on the side of the bed?: 3  Moving to and from a bed to a chair (including a wheelchair)?: 1  Need to walk in hospital room?: 1  Climbing 3-5 steps with  a railing?: 1  Total Score: 10    AM-PAC Raw Score CMS G-Code Modifier Level of Impairment Assistance   6 % Total / Unable   7 - 9 CM 80 - 100% Maximal Assist   10 - 14 CL 60 - 80% Moderate Assist   15 - 19 CK 40 - 60% Moderate Assist   20 - 22 CJ 20 - 40% Minimal Assist   23 CI 1-20% SBA / CGA   24 CH 0% Independent/ Mod I     Patient left supine with all lines intact, call button in reach and NSG notified.    Assessment:  Michelle Godfrey is a 57 y.o. female with a medical diagnosis of Acute renal failure and presents with continued dizziness and increased pain with sitting EOB.    Rehab identified problem list/impairments: Rehab identified problem list/impairments: weakness, impaired endurance, impaired self care skills, impaired functional mobilty, gait instability, impaired balance, decreased upper extremity function, decreased lower extremity function, pain    Rehab potential is good.    Activity tolerance: Fair    Discharge recommendations: Discharge Facility/Level Of Care Needs: nursing facility, basic     Barriers to discharge: Barriers to Discharge: None    Equipment recommendations: Equipment Needed After Discharge: none     GOALS:    Physical Therapy Goals        Problem: Physical Therapy Goal    Goal Priority Disciplines Outcome Goal Variances Interventions   Physical Therapy Goal     PT/OT, PT      Description:  1, Pt will progress  to minimal assistance for all transfers.  2 Initiation of standing into RW when pt is stable and demonstrates increased awareness  3. NM control of all four extremities will increase by one grade from present strength as documented    4. RW usu will progress with assistance as needed to complete 35 ft intervals                      PLAN:    Patient to be seen  (1-2x/day Monday-Friday; PRN Weekends/Holidays)  to address the above listed problems via gait training, therapeutic activities, therapeutic exercises  Plan of Care expires:  (upon D/C from facility)  Plan of  Care reviewed with: patient         Criselda Camryn, PT  10/02/2017

## 2017-10-02 NOTE — ASSESSMENT & PLAN NOTE
Chronic renal failure - Stage II, secondary to diabetes/htn likely.  At this time, she is markedly dehydrated with prerenal azotemia at play.  Has gotten fluids with improvement.  Avoid nephrotoxic meds.  Check daily BMP - now resolved and back to baseline.    UA noted - will follow cultures.

## 2017-10-02 NOTE — ASSESSMENT & PLAN NOTE
Checked Ammonia and ABG. CO2 at baseline  CT of head normal.    Seemed to have been secondary to uncontrolled infection  Now resolved

## 2017-10-02 NOTE — PT/OT/SLP PROGRESS
Physical Therapy  Treatment    Michelle Godfrey   MRN: [id number]   Admitting Diagnosis: Acute renal failure    PT Received On: 10/02/17  PT Start Time: 1020     PT Stop Time: 1045    PT Total Time (min): 25 min       Billable Minutes:  Therapeutic Activity 15 minutes and Therapeutic Exercise 10 minutes    Treatment Type: Treatment  PT/PTA: PT             General Precautions: Standard, fall  Orthopedic Precautions: N/A   Braces:           Subjective:  Communicated with patient prior to session.    Pain/Comfort  Pain Rating 1: 6/10  Location - Orientation 1: generalized  Location 1: back  Pain Addressed 1: Nurse notified  Pain Rating Post-Intervention 1: 6/10    Objective:   Patient found with: bed alarm, connor catheter, telemetry    Functional Mobility:  Bed Mobility:   Rolling/Turning to Left: Minimum assistance  Rolling/Turning Right: Minimum assistance  Scooting/Bridging: Minimum Assistance  Supine to Sit: Minimum Assistance  Sit to Supine: Minimum Assistance    Transfers: unable to attempt secondary to pain and dizziness       Balance:   Static Sit: POOR: Needs MODERATE assist to maintain  Dynamic Sit: POOR: N/A    Therapeutic Activities and Exercises:   Pt performed gentle A/A exercises on BLE supine in bed at all joints in available planes of motion with rest breaks as needed to improve BLE strength.     AM-PAC 6 CLICK MOBILITY  How much help from another person does this patient currently need?   1 = Unable, Total/Dependent Assistance  2 = A lot, Maximum/Moderate Assistance  3 = A little, Minimum/Contact Guard/Supervision  4 = None, Modified [place]/Independent    Turning over in bed (including adjusting bedclothes, sheets and blankets)?: 3  Sitting down on and standing up from a chair with arms (e.g., wheelchair, bedside commode, etc.): 1  Moving from lying on back to sitting on the side of the bed?: 3  Moving to and from a bed to a chair (including a wheelchair)?: 1  Need to walk in hospital room?:  1  Climbing 3-5 steps with a railing?: 1  Total Score: 10    AM-PAC Raw Score CMS G-Code Modifier Level of Impairment Assistance   6 % Total / Unable   7 - 9 CM 80 - 100% Maximal Assist   10 - 14 CL 60 - 80% Moderate Assist   15 - 19 CK 40 - 60% Moderate Assist   20 - 22 CJ 20 - 40% Minimal Assist   23 CI 1-20% SBA / CGA   24 CH 0% Independent/ Mod I     Patient left supine with all lines intact, call button in reach and NSG notified.    Assessment:  Michelle Godfrey is a 57 y.o. female with a medical diagnosis of Acute renal failure and presents with increased dizziness and SOB immediately upon sitting EOB.  Pt crying and unable to attempt standing of T/F OOB.    Rehab identified problem list/impairments: Rehab identified problem list/impairments: weakness, impaired endurance, impaired self care skills, impaired functional mobilty, gait instability, impaired balance, decreased lower extremity function, decreased upper extremity function, pain, decreased safety awareness    Rehab potential is fair.    Activity tolerance: Fair    Discharge recommendations: Discharge Facility/Level Of Care Needs: nursing facility, basic     Barriers to discharge: Barriers to Discharge: None    Equipment recommendations: Equipment Needed After Discharge: none     GOALS:    Physical Therapy Goals        Problem: Physical Therapy Goal    Goal Priority Disciplines Outcome Goal Variances Interventions   Physical Therapy Goal     PT/OT, PT      Description:  1, Pt will progress  to minimal assistance for all transfers.  2 Initiation of standing into RW when pt is stable and demonstrates increased awareness  3. NM control of all four extremities will increase by one grade from present strength as documented    4. RW usu will progress with assistance as needed to complete 35 ft intervals                      PLAN:    Patient to be seen  (1-2x/day Monday-Friday; PRN Weekends/Holidays)  to address the above listed problems via gait  training, therapeutic activities, therapeutic exercises  Plan of Care expires:  (upon D/C from facility)  Plan of Care reviewed with: patient         Criselda Camryn, PT  10/02/2017

## 2017-10-02 NOTE — ASSESSMENT & PLAN NOTE
About at baseline from a hypercapneic standpoint.  However, will add in nebs and treat for minimally worsened hypoxia.    Patinet with diffuse wheezing. Will add in scheduled nebs and continue abx.    Improving, on O2 at NH and at baseline

## 2017-10-02 NOTE — ASSESSMENT & PLAN NOTE
Continue home januvia.  Held metformin for renal function. Restarted 10/2  SSI ordered  Add Levemir 15 U.  A1c 8.8%

## 2017-10-02 NOTE — PLAN OF CARE
10/02/17 1111   Discharge Reassessment   Assessment Type Discharge Planning Reassessment     Pt wanted SW to contact family to inform they of her whereabouts. SW contacted sister Katihe Cho but was unable to inform bc no answer. SW will attempt to contact the pt's family later.    Tia Lama LMSW

## 2017-10-02 NOTE — PLAN OF CARE
Discussed plan of care with patient and Dr Ness. Patient will discharge to [facility] when urine culture results are received. Patient is deconditioned and will get PT today and until discharge.

## 2017-10-02 NOTE — SUBJECTIVE & OBJECTIVE
Interval History: no acute events overnight.     Review of Systems   Constitutional: Negative for activity change, fatigue, fever and unexpected weight change.   HENT: Negative for congestion, ear pain, hearing loss, rhinorrhea and sore throat.    Eyes: Negative for redness and visual disturbance.   Respiratory: Negative for cough, shortness of breath and wheezing.    Cardiovascular: Negative for chest pain, palpitations and leg swelling.   Gastrointestinal: Negative for abdominal pain, constipation, diarrhea, nausea and vomiting.   Genitourinary: Negative for dysuria, frequency and urgency.   Musculoskeletal: Positive for back pain (chronic). Negative for joint swelling and neck pain.   Skin: Negative for color change, rash and wound.   Neurological: Negative for dizziness, tremors, weakness, light-headedness and headaches.     Objective:     Vital Signs (Most Recent):  Temp: 98.8 °F (37.1 °C) (10/02/17 0908)  Pulse: 68 (10/02/17 1006)  Resp: 16 (10/02/17 0908)  BP: (!) 115/59 (10/02/17 0908)  SpO2: (!) 93 % (10/02/17 0908) Vital Signs (24h Range):  Temp:  [96.1 °F (35.6 °C)-100.1 °F (37.8 °C)] 98.8 °F (37.1 °C)  Pulse:  [66-83] 68  Resp:  [14-20] 16  SpO2:  [92 %-97 %] 93 %  BP: (115-152)/(59-76) 115/59     Weight: (!) 140.7 kg (310 lb 3 oz)  Body mass index is 50.07 kg/m².    Intake/Output Summary (Last 24 hours) at 10/02/17 1011  Last data filed at 10/02/17 0422   Gross per 24 hour   Intake              860 ml   Output             2500 ml   Net            -1640 ml      Physical Exam   Constitutional: She is oriented to person, place, and time. She appears well-developed and well-nourished. No distress.   HENT:   Head: Normocephalic and atraumatic.   Right Ear: External ear normal.   Left Ear: External ear normal.   Eyes: Conjunctivae and EOM are normal. Pupils are equal, round, and reactive to light. Right eye exhibits no discharge. Left eye exhibits no discharge.   Neck: Neck supple. No tracheal deviation  present.   Cardiovascular: Normal rate and regular rhythm.  Exam reveals no gallop and no friction rub.    No murmur heard.  Pulmonary/Chest: Effort normal and breath sounds normal. No respiratory distress. She has no wheezes. She has no rales.   Abdominal: Soft. Bowel sounds are normal. She exhibits no distension. There is no tenderness.   Neurological: She is alert and oriented to person, place, and time. No cranial nerve deficit.   Skin: Skin is warm and dry.   Psychiatric: She has a normal mood and affect. Her behavior is normal.   Nursing note and vitals reviewed.      Significant Labs:   CBC:   Recent Labs  Lab 10/01/17  0614 10/02/17  0850   WBC 9.76 11.07   HGB 10.4* 10.5*   HCT 34.5* 34.0*    195     CMP:   Recent Labs  Lab 10/01/17  0614 10/02/17  0800    138   K 5.0 5.6*    99   CO2 36* 32*   * 205*   BUN 32* 24*   CREATININE 0.9 0.9   CALCIUM 9.0 9.5   PROT 6.0  --    ALBUMIN 2.5*  --    BILITOT 0.2  --    ALKPHOS 61  --    AST 12  --    ALT 12  --    ANIONGAP 6* 7*   EGFRNONAA >60 >60     All pertinent labs within the past 24 hours have been reviewed.    Significant Imaging: I have reviewed all pertinent imaging results/findings within the past 24 hours.
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
## 2017-10-02 NOTE — PLAN OF CARE
Problem: Patient Care Overview  Goal: Plan of Care Review  Outcome: Ongoing (interventions implemented as appropriate)  Patient had a good night. Pain med just given. 02 resumed at 2 liters N/C and Bipap taken off by patient request. CRC notified. SR on telemetry. Pratt draining to gravity. E-sitter maintained. Instructed to call for needs/asst.

## 2017-10-03 VITALS
RESPIRATION RATE: 18 BRPM | WEIGHT: 293 LBS | TEMPERATURE: 98 F | DIASTOLIC BLOOD PRESSURE: 79 MMHG | OXYGEN SATURATION: 96 % | BODY MASS INDEX: 47.09 KG/M2 | SYSTOLIC BLOOD PRESSURE: 145 MMHG | HEART RATE: 82 BPM | HEIGHT: 66 IN

## 2017-10-03 PROBLEM — N30.00 ACUTE CYSTITIS WITHOUT HEMATURIA: Status: ACTIVE | Noted: 2017-10-01

## 2017-10-03 LAB
ANION GAP SERPL CALC-SCNC: 8 MMOL/L
BASOPHILS # BLD AUTO: 0.03 K/UL
BASOPHILS NFR BLD: 0.3 %
BUN SERPL-MCNC: 23 MG/DL
CALCIUM SERPL-MCNC: 9.7 MG/DL
CHLORIDE SERPL-SCNC: 98 MMOL/L
CO2 SERPL-SCNC: 31 MMOL/L
CREAT SERPL-MCNC: 0.8 MG/DL
DIFFERENTIAL METHOD: ABNORMAL
EOSINOPHIL # BLD AUTO: 0.5 K/UL
EOSINOPHIL NFR BLD: 4.3 %
ERYTHROCYTE [DISTWIDTH] IN BLOOD BY AUTOMATED COUNT: 14.5 %
EST. GFR  (AFRICAN AMERICAN): >60 ML/MIN/1.73 M^2
EST. GFR  (NON AFRICAN AMERICAN): >60 ML/MIN/1.73 M^2
GLUCOSE SERPL-MCNC: 215 MG/DL
HCT VFR BLD AUTO: 35.1 %
HGB BLD-MCNC: 11.1 G/DL
LYMPHOCYTES # BLD AUTO: 5.7 K/UL
LYMPHOCYTES NFR BLD: 49.1 %
MCH RBC QN AUTO: 30.2 PG
MCHC RBC AUTO-ENTMCNC: 31.6 G/DL
MCV RBC AUTO: 96 FL
MONOCYTES # BLD AUTO: 1.7 K/UL
MONOCYTES NFR BLD: 14.8 %
NEUTROPHILS # BLD AUTO: 3.6 K/UL
NEUTROPHILS NFR BLD: 31.5 %
PLATELET # BLD AUTO: 201 K/UL
PMV BLD AUTO: 13 FL
POCT GLUCOSE: 212 MG/DL (ref 70–110)
POCT GLUCOSE: 256 MG/DL (ref 70–110)
POTASSIUM SERPL-SCNC: 5.2 MMOL/L
RBC # BLD AUTO: 3.67 M/UL
SODIUM SERPL-SCNC: 137 MMOL/L
WBC # BLD AUTO: 11.52 K/UL

## 2017-10-03 PROCEDURE — 94640 AIRWAY INHALATION TREATMENT: CPT

## 2017-10-03 PROCEDURE — 25000242 PHARM REV CODE 250 ALT 637 W/ HCPCS: Performed by: FAMILY MEDICINE

## 2017-10-03 PROCEDURE — 25000003 PHARM REV CODE 250: Performed by: FAMILY MEDICINE

## 2017-10-03 PROCEDURE — 85007 BL SMEAR W/DIFF WBC COUNT: CPT

## 2017-10-03 PROCEDURE — 36415 COLL VENOUS BLD VENIPUNCTURE: CPT

## 2017-10-03 PROCEDURE — 82962 GLUCOSE BLOOD TEST: CPT

## 2017-10-03 PROCEDURE — 80048 BASIC METABOLIC PNL TOTAL CA: CPT

## 2017-10-03 PROCEDURE — 94660 CPAP INITIATION&MGMT: CPT

## 2017-10-03 PROCEDURE — 99232 SBSQ HOSP IP/OBS MODERATE 35: CPT | Mod: ,,, | Performed by: INTERNAL MEDICINE

## 2017-10-03 PROCEDURE — 94760 N-INVAS EAR/PLS OXIMETRY 1: CPT

## 2017-10-03 PROCEDURE — 85027 COMPLETE CBC AUTOMATED: CPT

## 2017-10-03 PROCEDURE — 97530 THERAPEUTIC ACTIVITIES: CPT

## 2017-10-03 PROCEDURE — 63600175 PHARM REV CODE 636 W HCPCS: Performed by: FAMILY MEDICINE

## 2017-10-03 PROCEDURE — 25000003 PHARM REV CODE 250: Performed by: INTERNAL MEDICINE

## 2017-10-03 PROCEDURE — 97116 GAIT TRAINING THERAPY: CPT

## 2017-10-03 PROCEDURE — 25000003 PHARM REV CODE 250: Performed by: SURGERY

## 2017-10-03 PROCEDURE — 99900035 HC TECH TIME PER 15 MIN (STAT)

## 2017-10-03 PROCEDURE — G8980 MOBILITY D/C STATUS: HCPCS | Mod: CL

## 2017-10-03 PROCEDURE — 27000221 HC OXYGEN, UP TO 24 HOURS

## 2017-10-03 PROCEDURE — G8979 MOBILITY GOAL STATUS: HCPCS | Mod: CK

## 2017-10-03 PROCEDURE — 63600175 PHARM REV CODE 636 W HCPCS: Performed by: SURGERY

## 2017-10-03 RX ORDER — SULFAMETHOXAZOLE AND TRIMETHOPRIM 800; 160 MG/1; MG/1
1 TABLET ORAL 2 TIMES DAILY
Qty: 16 TABLET | Refills: 0 | Status: SHIPPED | OUTPATIENT
Start: 2017-10-03 | End: 2017-10-13

## 2017-10-03 RX ORDER — LEVOFLOXACIN 750 MG/1
750 TABLET ORAL DAILY
Qty: 8 TABLET | Refills: 0 | Status: SHIPPED | OUTPATIENT
Start: 2017-10-03 | End: 2017-10-13

## 2017-10-03 RX ADMIN — GABAPENTIN 300 MG: 300 CAPSULE ORAL at 05:10

## 2017-10-03 RX ADMIN — ESCITALOPRAM 10 MG: 10 TABLET, FILM COATED ORAL at 08:10

## 2017-10-03 RX ADMIN — ASPIRIN 81 MG: 81 TABLET ORAL at 08:10

## 2017-10-03 RX ADMIN — IPRATROPIUM BROMIDE AND ALBUTEROL SULFATE 3 ML: .5; 3 SOLUTION RESPIRATORY (INHALATION) at 06:10

## 2017-10-03 RX ADMIN — CIPROFLOXACIN 400 MG: 2 INJECTION, SOLUTION INTRAVENOUS at 09:10

## 2017-10-03 RX ADMIN — HYDROCODONE BITARTRATE AND ACETAMINOPHEN 1 TABLET: 5; 325 TABLET ORAL at 09:10

## 2017-10-03 RX ADMIN — IPRATROPIUM BROMIDE AND ALBUTEROL SULFATE 3 ML: .5; 3 SOLUTION RESPIRATORY (INHALATION) at 01:10

## 2017-10-03 RX ADMIN — SITAGLIPTIN 100 MG: 50 TABLET, FILM COATED ORAL at 08:10

## 2017-10-03 RX ADMIN — INSULIN ASPART 3 UNITS: 100 INJECTION, SOLUTION INTRAVENOUS; SUBCUTANEOUS at 11:10

## 2017-10-03 RX ADMIN — LEVOTHYROXINE SODIUM 100 MCG: 100 TABLET ORAL at 05:10

## 2017-10-03 RX ADMIN — HYDROCODONE BITARTRATE AND ACETAMINOPHEN 1 TABLET: 5; 325 TABLET ORAL at 04:10

## 2017-10-03 RX ADMIN — INSULIN ASPART 2 UNITS: 100 INJECTION, SOLUTION INTRAVENOUS; SUBCUTANEOUS at 08:10

## 2017-10-03 RX ADMIN — VANCOMYCIN HYDROCHLORIDE 1500 MG: 1 INJECTION, POWDER, LYOPHILIZED, FOR SOLUTION INTRAVENOUS at 04:10

## 2017-10-03 RX ADMIN — METFORMIN HYDROCHLORIDE 1000 MG: 500 TABLET ORAL at 08:10

## 2017-10-03 RX ADMIN — BUSPIRONE HYDROCHLORIDE 10 MG: 10 TABLET ORAL at 05:10

## 2017-10-03 RX ADMIN — PIPERACILLIN AND TAZOBACTAM 3.38 G: 3; .375 INJECTION, POWDER, LYOPHILIZED, FOR SOLUTION INTRAVENOUS; PARENTERAL at 08:10

## 2017-10-03 RX ADMIN — PIPERACILLIN AND TAZOBACTAM 3.38 G: 3; .375 INJECTION, POWDER, LYOPHILIZED, FOR SOLUTION INTRAVENOUS; PARENTERAL at 12:10

## 2017-10-03 RX ADMIN — IPRATROPIUM BROMIDE AND ALBUTEROL SULFATE 3 ML: .5; 3 SOLUTION RESPIRATORY (INHALATION) at 12:10

## 2017-10-03 RX ADMIN — DIVALPROEX SODIUM 1000 MG: 500 TABLET, FILM COATED, EXTENDED RELEASE ORAL at 08:10

## 2017-10-03 NOTE — ASSESSMENT & PLAN NOTE
Chronic renal failure - Stage II, secondary to diabetes/htn likely.  On admit she is markedly dehydrated with prerenal azotemia   Has gotten fluids with improvement and renal fxn normalized  Avoid nephrotoxic meds.      UA noted - will follow cultures.

## 2017-10-03 NOTE — PLAN OF CARE
10/03/17 1058   Discharge Assessment   Assessment Type Discharge Planning Reassessment     SW contacted [facility] and informed them that the pt doesn't have any clothes for d/c. [facility] also set up transportation for the pt d/c.    Tia Lama LMSW

## 2017-10-03 NOTE — PT/OT/SLP PROGRESS
"Physical Therapy  Treatment    Michelle Godfrey   MRN: [id number]   Admitting Diagnosis: Acute renal failure    PT Received On: 10/03/17  PT Start Time: 1020     PT Stop Time: 1050    PT Total Time (min): 30 min       Billable Minutes:  Gait [id number] minutes and Therapeutic Activity 15 minutes    Treatment Type: Treatment  PT/PTA: PT             General Precautions: Standard, fall  Orthopedic Precautions: N/A   Braces: N/A         Subjective:  Communicated with patient prior to session.  "I'll try to get up.  I feel better than yesterday."    Pain/Comfort  Pain Rating 1: 3/10  Location - Orientation 1: generalized  Location 1: back  Pain Addressed 1: Nurse notified, Reposition  Pain Rating Post-Intervention 1: 3/10    Objective:   Patient found with: bed alarm, telemetry, SCD, oxygen    Functional Mobility:  Bed Mobility:   Rolling/Turning to Left: Contact guard assistance  Rolling/Turning Right: Contact guard assistance  Scooting/Bridging: Contact Guard Assistance  Supine to Sit: Contact Guard Assistance  Sit to Supine: Contact Guard Assistance    Transfers:  Sit <> Stand Assistance: Minimum Assistance x 2 bouts  Sit <> Stand Assistive Device: Rolling Walker    Gait:   Gait Distance: 15 feet  Assistance 1: Minimum assistance  Gait Assistive Device: Rolling walker  Gait Pattern: swing-to gait  Gait Deviation(s): decreased velocity of limb motion, increased time in double stance, decreased toe-to-floor clearance, decreased weight-shifting ability, decreased step length  Gait Training:  Pt. Amb. 15' with RW, min. A.  VC's and tactile cues given to pt. for step placement and A.D. placement.      Balance:   Static Sit: GOOD-: Takes MODERATE challenges from all directions but inconsistently  Dynamic Sit: GOOD-: Maintains balance through MODERATE excursions of active trunk movement,     Static Stand: FAIR: Maintains without assist but unable to take challenges  Dynamic stand: POOR: N/A     Therapeutic Activities and " Exercises:  There. Act.:  Weight shifting and weight acceptance tasks performed with min. A. while pt. standing.  Scooting tasks performed with min. A.  Trunk control tasks performed with min. A. while pt. seated EOB.  Transfer Training performed with assistance as noted.  VC's and manual guidance given to pt. for sequencing.     AM-PAC 6 CLICK MOBILITY  How much help from another person does this patient currently need?   1 = Unable, Total/Dependent Assistance  2 = A lot, Maximum/Moderate Assistance  3 = A little, Minimum/Contact Guard/Supervision  4 = None, Modified [place]/Independent    Turning over in bed (including adjusting bedclothes, sheets and blankets)?: 3  Sitting down on and standing up from a chair with arms (e.g., wheelchair, bedside commode, etc.): 2  Moving from lying on back to sitting on the side of the bed?: 3  Moving to and from a bed to a chair (including a wheelchair)?: 2  Need to walk in hospital room?: 2  Climbing 3-5 steps with a railing?: 1  Total Score: 13    AM-PAC Raw Score CMS G-Code Modifier Level of Impairment Assistance   6 % Total / Unable   7 - 9 CM 80 - 100% Maximal Assist   10 - 14 CL 60 - 80% Moderate Assist   15 - 19 CK 40 - 60% Moderate Assist   20 - 22 CJ 20 - 40% Minimal Assist   23 CI 1-20% SBA / CGA   24 CH 0% Independent/ Mod I     Patient left HOB elevated with all lines intact, call button in reach and RN notified.    Assessment:  Michelle Godfrey is a 57 y.o. female with a medical diagnosis of Acute renal failure and presents with increased distance with gait training.  Pt. has partially achieved PT POC goals and anticipates D/C to SNF today.  Please see PT discharge note.    Rehab identified problem list/impairments: Rehab identified problem list/impairments: weakness, gait instability, impaired endurance, impaired self care skills, impaired functional mobilty, decreased lower extremity function, decreased upper extremity function    Rehab potential is  fair.    Activity tolerance: Fair    Discharge recommendations: Discharge Facility/Level Of Care Needs: nursing facility, skilled     Barriers to discharge: Barriers to Discharge: None    Equipment recommendations: Equipment Needed After Discharge: none     GOALS:    Physical Therapy Goals        Problem: Physical Therapy Goal    Goal Priority Disciplines Outcome Goal Variances Interventions   Physical Therapy Goal     PT/OT, PT      Description:  1, Pt will progress  to minimal assistance for all transfers.  2 Initiation of standing into RW when pt is stable and demonstrates increased awareness  3. NM control of all four extremities will increase by one grade from present strength as documented    4. RW usu will progress with assistance as needed to complete 35 ft intervals                      PLAN:    Patient to be seen    to address the above listed problems via gait training, therapeutic activities, therapeutic exercises  Plan of Care expires: 10/03/17 (following a.m. PT tx. session.)  Plan of Care reviewed with: patient         Jajajassi Ashley, PT  10/03/2017

## 2017-10-03 NOTE — ASSESSMENT & PLAN NOTE
About at baseline from a hypercapneic standpoint.  Patinet with diffuse wheezing. Will add in scheduled nebs and continue abx. Now resolved    On O2 at NH and at baseline

## 2017-10-03 NOTE — PT/OT/SLP DISCHARGE
Physical Therapy Discharge Summary    Michelle Godfrey  MRN: [id number]   Acute renal failure   Patient Discharged from acute Physical Therapy on 10/3/2017.  Please refer to prior PT noted date on 10/3/2017 for functional status.     Assessment:   Goals partially met. Patient appropriate for care in another setting.  GOALS:    Physical Therapy Goals        Problem: Physical Therapy Goal    Goal Priority Disciplines Outcome Goal Variances Interventions   Physical Therapy Goal     PT/OT, PT      Description:  1, Pt will progress  to minimal assistance for all transfers.  2 Initiation of standing into RW when pt is stable and demonstrates increased awareness  3. NM control of all four extremities will increase by one grade from present strength as documented    4. RW usu will progress with assistance as needed to complete 35 ft intervals                    Reasons for Discontinuation of Therapy Services  Transfer to alternate level of care.      Plan:  Patient Discharged to: Skilled Nursing Facility.
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
## 2017-10-03 NOTE — ASSESSMENT & PLAN NOTE
Chronic issue.  Infiltrate on XR noted.  Will send home with levaquin to complete 10 days total  Afebrile, normal WBC ct.  No add'l steroid needed. Cont scheduled nebs.

## 2017-10-03 NOTE — ASSESSMENT & PLAN NOTE
Treat for MRSA pna with infiltrate, elevated WBC ct, altered mental status.  Was on Vanc, zosyn and cipro    Home on PO Levaquin to complete 10 days total of antibx

## 2017-10-03 NOTE — ASSESSMENT & PLAN NOTE
Reviewed last UCx - resis to [facility]. Patient was on levaquin.  UCx pending currently but growing GNR.    Will send home with bactrim in addition to Levaquin for PNA and follow up cx.

## 2017-10-03 NOTE — PLAN OF CARE
Dr Ness here and will discharge patient. She requests skilled care for PT due to deconditioning from hospital stay.. I discussed placement with patient including  [facility] and she wants her first choice to be [facility]. Agueda notified at [facility] and patient will discharge today.

## 2017-10-03 NOTE — PLAN OF CARE
Problem: Patient Care Overview  Goal: Plan of Care Review  Outcome: Ongoing (interventions implemented as appropriate)  Educate patient/family/cargiver about administration of aerosolized medications via the inhalation route to aid in bronchial hygiene & deliver medications.  Educate patient/family/cargiver about indications, benefits, &  safety of use  Benefits:  Increased FRC & PaO2  Decreased work of breathing  Reduce intrapulmonary shunt  Avoid tracheal intubation & mechanical intubationPatient educated on use, benefits, & safety of oxygen use.  Respiratory treatments are Q6 hours. Pt. Wears BIPAP to sleep and NC @ 2lpm. During the day.

## 2017-10-03 NOTE — ASSESSMENT & PLAN NOTE
Reviewed last UCx - resis to [facility]. Patient was on levaquin.  UCx pending currently but growing GNR.    Will send home with bactrim in addition to Levaquin for PNA and follow up cx.

## 2017-10-03 NOTE — SUBJECTIVE & OBJECTIVE
Interval History: No acute events. Has not ambulated yet    Review of Systems   Constitutional: Negative for activity change, fatigue, fever and unexpected weight change.   HENT: Negative for congestion, ear pain, hearing loss, rhinorrhea, sore throat and tinnitus.    Eyes: Negative for pain, redness and visual disturbance.   Respiratory: Negative for cough, shortness of breath and wheezing.    Cardiovascular: Negative for chest pain, palpitations and leg swelling.   Gastrointestinal: Negative for abdominal pain, blood in stool, constipation, diarrhea, nausea and vomiting.   Genitourinary: Negative for dysuria, frequency, pelvic pain and urgency.   Musculoskeletal: Positive for back pain (chronic). Negative for joint swelling and neck pain.   Skin: Negative for color change, rash and wound.   Neurological: Negative for dizziness, tremors, weakness, light-headedness and headaches.     Objective:     Vital Signs (Most Recent):  Temp: 97.2 °F (36.2 °C) (10/03/17 0731)  Pulse: 67 (10/03/17 0731)  Resp: 18 (10/03/17 0731)  BP: 123/78 (10/03/17 0731)  SpO2: (!) 94 % (10/03/17 0731) Vital Signs (24h Range):  Temp:  [96.5 °F (35.8 °C)-98.8 °F (37.1 °C)] 97.2 °F (36.2 °C)  Pulse:  [54-83] 67  Resp:  [14-20] 18  SpO2:  [93 %-99 %] 94 %  BP: (115-169)/(56-79) 123/78     Weight: (!) 140.7 kg (310 lb 3 oz)  Body mass index is 50.07 kg/m².    Intake/Output Summary (Last 24 hours) at 10/03/17 0843  Last data filed at 10/03/17 0600   Gross per 24 hour   Intake             1540 ml   Output             2101 ml   Net             -561 ml      Physical Exam   Constitutional: She is oriented to person, place, and time. She appears well-developed and well-nourished. No distress.   HENT:   Head: Normocephalic and atraumatic.   Right Ear: External ear normal.   Left Ear: External ear normal.   Eyes: Conjunctivae and EOM are normal. Pupils are equal, round, and reactive to light. Right eye exhibits no discharge. Left eye exhibits no  discharge.   Neck: Neck supple. No tracheal deviation present.   Cardiovascular: Normal rate and regular rhythm.  Exam reveals no gallop and no friction rub.    No murmur heard.  Pulmonary/Chest: Effort normal and breath sounds normal. No respiratory distress. She has no wheezes. She has no rales.   Abdominal: Soft. Bowel sounds are normal. She exhibits no distension. There is no tenderness.   Neurological: She is alert and oriented to person, place, and time. No cranial nerve deficit.   Skin: Skin is warm and dry.   Psychiatric: She has a normal mood and affect. Her behavior is normal.   Nursing note and vitals reviewed.      Significant Labs:   CBC:   Recent Labs  Lab 10/02/17  0850 10/03/17  0458   WBC 11.07 11.52   HGB 10.5* 11.1*   HCT 34.0* 35.1*    201     CMP:   Recent Labs  Lab 10/02/17  0800 10/03/17  0458    137   K 5.6* 5.2*   CL 99 98   CO2 32* 31*   * 215*   BUN 24* 23*   CREATININE 0.9 0.8   CALCIUM 9.5 9.7   ANIONGAP 7* 8   EGFRNONAA >60 >60     All pertinent labs within the past 24 hours have been reviewed.    Significant Imaging: I have reviewed all pertinent imaging results/findings within the past 24 hours.

## 2017-10-03 NOTE — PLAN OF CARE
Problem: Patient Care Overview  Goal: Plan of Care Review  Outcome: Ongoing (interventions implemented as appropriate)  Patient alert and oriented. Hydrocodone for back and neck pain. PT here to work with patient. Urinary catheter discontinued; patient voiding appropriately. Glucometer checks. BS high 302 & 291 discussed with Dr. Ness. Patient is on oral diabetes med, S/S and levemir insulin. Afebrile. Lab had problems collecting lab work this am. New IV access obtained on this shift per MICHAEL Cespedes house supervisor. E-sitter in use will continue to monitor. Agrees with plan of care.

## 2017-10-03 NOTE — PLAN OF CARE
Problem: Patient Care Overview  Goal: Plan of Care Review  Outcome: Ongoing (interventions implemented as appropriate)  Plan of care reviewed with patient and she agrees with the plan of care. BIPAP in use most of night. Oxygen at 2l per nasal cannula when BIPAP in use. Nebs every 6 hours. Zosyn, Cipro and Vancomycin continues without any adverse reactions. Accuchecks continue ac&hs Supplemental insulin given this shift. Telemetry monitoring continues. C/o pain to bilateral lower back. Hydrocodone effective for pain.
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
## 2017-10-03 NOTE — PLAN OF CARE
Afebrile. Denies sob. Voiding spontaneously, frequently. Up walking in room with PT. Hydrocodone given for pain x 1 dose. Discharged to [facility] at [facility]. Follow up education complete. No questions or concerns. Patient understands plan of care and agrees.

## 2017-10-03 NOTE — PROGRESS NOTES
Bedside report received from MICHAEL Dennison. Lying comfortably in bed with oxygen per nasal cannula. Denies needs. Bed alarm engaged. No distress noted.

## 2017-10-03 NOTE — PHARMACY MED REC
[facility] Medication Reconciliation  Template    Patient was admitted on 9/29/2017 for Acute renal failure.      Patient's prior to admission medication regimen was as follows:  Prescriptions Prior to Admission   Medication Sig Dispense Refill Last Dose    albuterol (PROVENTIL) 2.5 mg /3 mL (0.083 %) nebulizer solution Take 2.5 mg by nebulization every 6 (six) hours as needed for Wheezing.   9/29/2017 at Unknown time    allopurinol (ZYLOPRIM) 100 MG tablet Take 100 mg by mouth 2 (two) times daily.   5 9/29/2017 at Unknown time    aspirin (ECOTRIN) 81 MG EC tablet Take 81 mg by mouth once daily.   9/29/2017 at Unknown time    budesonide-formoterol 160-4.5 mcg (SYMBICORT) 160-4.5 mcg/actuation HFAA Inhale 2 puffs into the lungs every 12 (twelve) hours.   9/29/2017 at Unknown time    busPIRone (BUSPAR) 10 MG tablet Take 10 mg by mouth 3 (three) times daily.   9/29/2017 at Unknown time    divalproex ER (DEPAKOTE) 500 MG Tb24 Take 3 tablets (1,500 mg total) by mouth 2 (two) times daily.   9/29/2017 at Unknown time    escitalopram oxalate (LEXAPRO) 10 MG tablet Take 20 mg by mouth every evening.    9/29/2017 at Unknown time    gabapentin (NEURONTIN) 300 MG capsule Take 300 mg by mouth 3 (three) times daily.   9/29/2017 at Unknown time    hydrocodone-acetaminophen 5-325mg (NORCO) 5-325 mg per tablet Take 1 tablet by mouth every 6 (six) hours as needed. 30 tablet 0 Past Month at Unknown time    levothyroxine (SYNTHROID) 100 MCG tablet Take 1 tablet (100 mcg total) by mouth before breakfast. 30 tablet 5 9/29/2017 at Unknown time    lisinopril 10 MG tablet Take 10 mg by mouth once daily.   9/29/2017 at Unknown time    metformin (GLUCOPHAGE) 1000 MG tablet Take 1 tablet (1,000 mg total) by mouth 2 (two) times daily. 60 tablet 0 9/29/2017 at Unknown time    pantoprazole (PROTONIX) 40 MG tablet Take 40 mg by mouth nightly.   5 9/29/2017 at Unknown time    pravastatin (PRAVACHOL) 20 MG tablet Take 20 mg by mouth  every evening.   5 9/29/2017 at Unknown time    SITagliptin (JANUVIA) 100 MG Tab Take 100 mg by mouth once daily.   9/29/2017 at Unknown time    TRUERESULT BLOOD GLUCOSE SYSTM kit   0 9/29/2017 at Unknown time    ULTRA THIN LANCETS 30 gauge Misc   0 9/29/2017 at Unknown time    BD ULTRA FINE LANCETS 33 gauge Misc   11 Unknown at Unknown time    furosemide (LASIX) 20 MG tablet Take 0.5 tablets (10 mg total) by mouth 2 (two) times daily. 30 tablet 0 9/24/2017    ipratropium (ATROVENT) 0.02 % nebulizer solution Take 500 mcg by nebulization 4 (four) times daily. Rescue   Unknown at Unknown time    [DISCONTINUED] alprazolam (XANAX) 0.5 MG tablet Take 1 tablet (0.5 mg total) by mouth 3 (three) times daily as needed for Anxiety (Withdrawal). 30 tablet 0 9/19/2017         Please insert appropriate  SmartPhrase below:  Discharge Medication Reconciliation - Pharmacy Consult Note     The discharge medication regimen was reviewed by Gwen Molina, Pharmacist. The following medications have been adjusted/changed:     Patient is to INITIATE the following medications    insulin detemir 100 unit/mL (3 mL) Inpn pen (LEVEMIR FLEXTOUCH)    levoFLOXacin 750 MG tablet (LEVAQUIN)    sulfamethoxazole-trimethoprim 800-160mg 800-160 mg Tab (BACTRIM DS)     Patient is to DISCONTINUE the following medications    alprazolam 0.5 MG tablet (XANAX)       Patient is to HOLD the following medications   · No change     The following medications DOSE or FREQUENCY was CHANGED  · No change        The following issues/concerns were addressed prior to discharge:   Discharge medication counseling   · Discussed medications with patient and educated her about the difference between an allergy and a side effects  · Talked about side effects and what she can possibly experience while taking medications  · Patient being discharged to nursing facility    Bedside delivery of medications     Patient being discharged to nursing facility    Medication  affordability   · No comments     PHARMACIST NAME : Marry MarquezD.  EXT [phone]

## 2017-10-03 NOTE — ASSESSMENT & PLAN NOTE
Continue home januvia.  Held metformin for renal function. Restarted 10/2  SSI ordered  Added Levemir 15 U. Cont at NH  A1c 8.8%

## 2017-10-04 ENCOUNTER — PATIENT OUTREACH (OUTPATIENT)
Dept: ADMINISTRATIVE | Facility: CLINIC | Age: 57
End: 2017-10-04

## 2017-10-04 LAB — BACTERIA UR CULT: NORMAL

## 2017-10-04 NOTE — PHYSICIAN QUERY
PT Name: Michelle Godfrey  MR #: [id number]  Physician Query Form - Renal Clarification     CDS/: RAGHAV Infante, RN, CCDS               Contact information: [phone]    This form is a permanent document in the medical record.     QueryDate: October 4, 2017    By submitting this query, we are merely seeking further clarification of documentation. Please utilize your independent clinical judgment when addressing the question(s) below.    The Medical record contains the following:   Indicator Supporting Clinical Findings Location in Medical Record    Kidney (Renal) Insufficiency     X Kidney (Renal) Failure / Injury Acute renal failure    Chronic renal failure - Stage II, secondary to diabetes/htn likely.   Progress Note: 10/3    Nephrotoxic Agents      BUN/Creatinine GFR      Urine: Casts         Eosinophils     X Dehydration On admit she is markedly dehydrated with prerenal azotemia   Has gotten fluids with improvement and renal fxn normalized   Progress Note: 10/3    Nausea/Vomiting      Dialysis/CRRT      Treatment:      Other:          Provider, please specify the diagnosis or diagnoses associated with above clinical findings.    [ ] Acute Kidney Failure/Injury with Acute Cortical Necrosis  [ ] Acute Kidney Failure/Injury with Medullary Necrosis  [ ] Acute Kidney Failure/Injury with Tubular Necrosis  [ ] Other Acute Kidney Failure/Injury (please specify): ____________  [x ] Unspecified Acute Kidney Failure/Injury  [ ] Acute Nephritic Syndrome  [ ] Acute Renal Insufficiency  [ ] Other (please specify): _______________________________  [ ] Clinically Undetermined    Please document in your progress notes daily for the duration of treatment, until resolved, and include in your discharge summary.

## 2017-10-04 NOTE — PLAN OF CARE
10/04/17 1415   Final Note   Assessment Type Final Discharge Note   Discharge Disposition SNF  (The pt will d/c to the [facility] skilled care.)   What phone number can be called within the next 1-3 days to see how you are doing after discharge? [phone]   Hospital Follow Up  Appt(s) scheduled? Yes   Discharge plans and expectations educations in teach back method with documentation complete? Yes   Right Care Referral Info   Post Acute Recommendation No Care

## 2017-12-01 ENCOUNTER — LAB VISIT (OUTPATIENT)
Dept: LAB | Facility: HOSPITAL | Age: 57
End: 2017-12-01
Attending: FAMILY MEDICINE
Payer: MEDICAID

## 2017-12-01 DIAGNOSIS — R50.9 FEVER: Primary | ICD-10-CM

## 2017-12-01 LAB
FLUAV AG SPEC QL IA: NEGATIVE
FLUBV AG SPEC QL IA: NEGATIVE
SPECIMEN SOURCE: NORMAL

## 2017-12-01 PROCEDURE — 87400 INFLUENZA A/B EACH AG IA: CPT

## 2018-04-23 ENCOUNTER — HOSPITAL ENCOUNTER (OUTPATIENT)
Facility: HOSPITAL | Age: 58
Discharge: NURSING, REHAB OR LONG-TERM CARE | End: 2018-04-25
Attending: SURGERY | Admitting: INTERNAL MEDICINE
Payer: MEDICAID

## 2018-04-23 DIAGNOSIS — R07.9 CHEST PAIN: ICD-10-CM

## 2018-04-23 DIAGNOSIS — R79.89 ELEVATED LACTIC ACID LEVEL: ICD-10-CM

## 2018-04-23 DIAGNOSIS — I25.10 CARDIOVASCULAR DISEASE: ICD-10-CM

## 2018-04-23 DIAGNOSIS — E86.0 DEHYDRATION: Primary | ICD-10-CM

## 2018-04-23 LAB
ALBUMIN SERPL BCP-MCNC: 3.1 G/DL
ALP SERPL-CCNC: 77 U/L
ALT SERPL W/O P-5'-P-CCNC: 15 U/L
AMPHET+METHAMPHET UR QL: NEGATIVE
ANION GAP SERPL CALC-SCNC: 11 MMOL/L
ANISOCYTOSIS BLD QL SMEAR: SLIGHT
APTT BLDCRRT: 22.2 SEC
AST SERPL-CCNC: 18 U/L
BARBITURATES UR QL SCN>200 NG/ML: NEGATIVE
BASOPHILS NFR BLD: 0 %
BENZODIAZ UR QL SCN>200 NG/ML: NEGATIVE
BILIRUB SERPL-MCNC: 0.2 MG/DL
BILIRUB UR QL STRIP: NEGATIVE
BNP SERPL-MCNC: <10 PG/ML
BUN SERPL-MCNC: 41 MG/DL
BZE UR QL SCN: NEGATIVE
CALCIUM SERPL-MCNC: 9.3 MG/DL
CANNABINOIDS UR QL SCN: NEGATIVE
CHLORIDE SERPL-SCNC: 100 MMOL/L
CK MB SERPL-MCNC: 1.4 NG/ML
CK MB SERPL-RTO: 1.1 %
CK SERPL-CCNC: 126 U/L
CK SERPL-CCNC: 126 U/L
CLARITY UR: CLEAR
CO2 SERPL-SCNC: 28 MMOL/L
COLOR UR: YELLOW
CREAT SERPL-MCNC: 1.3 MG/DL
CREAT UR-MCNC: 62.1 MG/DL
D DIMER PPP IA.FEU-MCNC: 0.72 MG/L FEU
DIFFERENTIAL METHOD: ABNORMAL
EOSINOPHIL NFR BLD: 3 %
ERYTHROCYTE [DISTWIDTH] IN BLOOD BY AUTOMATED COUNT: 16.1 %
EST. GFR  (AFRICAN AMERICAN): 52 ML/MIN/1.73 M^2
EST. GFR  (NON AFRICAN AMERICAN): 45 ML/MIN/1.73 M^2
GLUCOSE SERPL-MCNC: 184 MG/DL
GLUCOSE UR QL STRIP: NEGATIVE
HCT VFR BLD AUTO: 33 %
HGB BLD-MCNC: 10.3 G/DL
HGB UR QL STRIP: NEGATIVE
INR PPP: 1
KETONES UR QL STRIP: NEGATIVE
LACTATE SERPL-SCNC: 1.4 MMOL/L
LACTATE SERPL-SCNC: 1.4 MMOL/L
LACTATE SERPL-SCNC: 1.7 MMOL/L
LACTATE SERPL-SCNC: 3.6 MMOL/L
LEUKOCYTE ESTERASE UR QL STRIP: ABNORMAL
LYMPHOCYTES NFR BLD: 59 %
MAGNESIUM SERPL-MCNC: 1.7 MG/DL
MCH RBC QN AUTO: 29.7 PG
MCHC RBC AUTO-ENTMCNC: 31.2 G/DL
MCV RBC AUTO: 95 FL
METHADONE UR QL SCN>300 NG/ML: NEGATIVE
MICROSCOPIC COMMENT: NORMAL
MONOCYTES NFR BLD: 7 %
NEUTROPHILS NFR BLD: 31 %
NITRITE UR QL STRIP: NEGATIVE
OPIATES UR QL SCN: NORMAL
PATH REV BLD -IMP: NORMAL
PCP UR QL SCN>25 NG/ML: NEGATIVE
PH UR STRIP: 6 [PH] (ref 5–8)
PHOSPHATE SERPL-MCNC: 4 MG/DL
PLATELET # BLD AUTO: 199 K/UL
PMV BLD AUTO: 13.4 FL
POCT GLUCOSE: 154 MG/DL (ref 70–110)
POIKILOCYTOSIS BLD QL SMEAR: SLIGHT
POTASSIUM SERPL-SCNC: 5.3 MMOL/L
PROT SERPL-MCNC: 6.5 G/DL
PROT UR QL STRIP: NEGATIVE
PROTHROMBIN TIME: 9.9 SEC
RBC # BLD AUTO: 3.47 M/UL
SODIUM SERPL-SCNC: 139 MMOL/L
SP GR UR STRIP: 1.01 (ref 1–1.03)
TOXICOLOGY INFORMATION: NORMAL
TROPONIN I SERPL DL<=0.01 NG/ML-MCNC: <0.006 NG/ML
URN SPEC COLLECT METH UR: ABNORMAL
UROBILINOGEN UR STRIP-ACNC: NEGATIVE EU/DL
WBC # BLD AUTO: 14.99 K/UL
WBC #/AREA URNS HPF: 2 /HPF (ref 0–5)

## 2018-04-23 PROCEDURE — 27000190 HC CPAP FULL FACE MASK W/VALVE

## 2018-04-23 PROCEDURE — 25000003 PHARM REV CODE 250: Performed by: FAMILY MEDICINE

## 2018-04-23 PROCEDURE — 25000003 PHARM REV CODE 250: Performed by: SURGERY

## 2018-04-23 PROCEDURE — 96361 HYDRATE IV INFUSION ADD-ON: CPT

## 2018-04-23 PROCEDURE — 83880 ASSAY OF NATRIURETIC PEPTIDE: CPT

## 2018-04-23 PROCEDURE — 85730 THROMBOPLASTIN TIME PARTIAL: CPT

## 2018-04-23 PROCEDURE — 25500020 PHARM REV CODE 255: Performed by: SURGERY

## 2018-04-23 PROCEDURE — 84484 ASSAY OF TROPONIN QUANT: CPT | Mod: 91

## 2018-04-23 PROCEDURE — 93010 ELECTROCARDIOGRAM REPORT: CPT | Mod: ,,, | Performed by: INTERNAL MEDICINE

## 2018-04-23 PROCEDURE — 27000221 HC OXYGEN, UP TO 24 HOURS

## 2018-04-23 PROCEDURE — 36415 COLL VENOUS BLD VENIPUNCTURE: CPT

## 2018-04-23 PROCEDURE — 94660 CPAP INITIATION&MGMT: CPT

## 2018-04-23 PROCEDURE — 83036 HEMOGLOBIN GLYCOSYLATED A1C: CPT

## 2018-04-23 PROCEDURE — 93005 ELECTROCARDIOGRAM TRACING: CPT

## 2018-04-23 PROCEDURE — 83735 ASSAY OF MAGNESIUM: CPT

## 2018-04-23 PROCEDURE — 85060 BLOOD SMEAR INTERPRETATION: CPT | Mod: ,,, | Performed by: PATHOLOGY

## 2018-04-23 PROCEDURE — 63600175 PHARM REV CODE 636 W HCPCS: Performed by: SURGERY

## 2018-04-23 PROCEDURE — 99900035 HC TECH TIME PER 15 MIN (STAT)

## 2018-04-23 PROCEDURE — 80053 COMPREHEN METABOLIC PANEL: CPT

## 2018-04-23 PROCEDURE — 85007 BL SMEAR W/DIFF WBC COUNT: CPT | Mod: NCS

## 2018-04-23 PROCEDURE — 82550 ASSAY OF CK (CPK): CPT

## 2018-04-23 PROCEDURE — 96374 THER/PROPH/DIAG INJ IV PUSH: CPT

## 2018-04-23 PROCEDURE — G0378 HOSPITAL OBSERVATION PER HR: HCPCS

## 2018-04-23 PROCEDURE — 81000 URINALYSIS NONAUTO W/SCOPE: CPT | Mod: 59

## 2018-04-23 PROCEDURE — 80307 DRUG TEST PRSMV CHEM ANLYZR: CPT

## 2018-04-23 PROCEDURE — 83605 ASSAY OF LACTIC ACID: CPT | Mod: 91

## 2018-04-23 PROCEDURE — 85027 COMPLETE CBC AUTOMATED: CPT

## 2018-04-23 PROCEDURE — 99285 EMERGENCY DEPT VISIT HI MDM: CPT | Mod: 25

## 2018-04-23 PROCEDURE — 87040 BLOOD CULTURE FOR BACTERIA: CPT

## 2018-04-23 PROCEDURE — 85610 PROTHROMBIN TIME: CPT

## 2018-04-23 PROCEDURE — 85379 FIBRIN DEGRADATION QUANT: CPT

## 2018-04-23 PROCEDURE — 84100 ASSAY OF PHOSPHORUS: CPT

## 2018-04-23 PROCEDURE — 94761 N-INVAS EAR/PLS OXIMETRY MLT: CPT

## 2018-04-23 PROCEDURE — 82553 CREATINE MB FRACTION: CPT

## 2018-04-23 RX ORDER — HYDROCODONE BITARTRATE AND ACETAMINOPHEN 7.5; 325 MG/1; MG/1
1 TABLET ORAL ONCE
Status: COMPLETED | OUTPATIENT
Start: 2018-04-23 | End: 2018-04-23

## 2018-04-23 RX ORDER — ACETAMINOPHEN 325 MG/1
650 TABLET ORAL EVERY 8 HOURS PRN
Status: DISCONTINUED | OUTPATIENT
Start: 2018-04-23 | End: 2018-04-25 | Stop reason: HOSPADM

## 2018-04-23 RX ORDER — IBUPROFEN 200 MG
16 TABLET ORAL
Status: DISCONTINUED | OUTPATIENT
Start: 2018-04-23 | End: 2018-04-25 | Stop reason: HOSPADM

## 2018-04-23 RX ORDER — INSULIN LISPRO 100 [IU]/ML
INJECTION, SOLUTION INTRAVENOUS; SUBCUTANEOUS
Status: ON HOLD | COMMUNITY
End: 2018-04-25

## 2018-04-23 RX ORDER — SODIUM CHLORIDE 9 MG/ML
1000 INJECTION, SOLUTION INTRAVENOUS
Status: COMPLETED | OUTPATIENT
Start: 2018-04-23 | End: 2018-04-23

## 2018-04-23 RX ORDER — LEVOTHYROXINE SODIUM 100 UG/1
100 TABLET ORAL
Status: DISCONTINUED | OUTPATIENT
Start: 2018-04-24 | End: 2018-04-25 | Stop reason: HOSPADM

## 2018-04-23 RX ORDER — ESCITALOPRAM OXALATE 20 MG/1
20 TABLET ORAL NIGHTLY
Status: DISCONTINUED | OUTPATIENT
Start: 2018-04-23 | End: 2018-04-25 | Stop reason: HOSPADM

## 2018-04-23 RX ORDER — HYDROCODONE BITARTRATE AND ACETAMINOPHEN 7.5; 325 MG/1; MG/1
1 TABLET ORAL EVERY 8 HOURS PRN
Status: ON HOLD | COMMUNITY
End: 2018-10-17 | Stop reason: HOSPADM

## 2018-04-23 RX ORDER — LANOLIN ALCOHOL/MO/W.PET/CERES
400 CREAM (GRAM) TOPICAL 2 TIMES DAILY
COMMUNITY
End: 2020-03-03

## 2018-04-23 RX ORDER — GLUCAGON 1 MG
1 KIT INJECTION
Status: DISCONTINUED | OUTPATIENT
Start: 2018-04-23 | End: 2018-04-25 | Stop reason: HOSPADM

## 2018-04-23 RX ORDER — ONDANSETRON 2 MG/ML
4 INJECTION INTRAMUSCULAR; INTRAVENOUS EVERY 8 HOURS PRN
Status: DISCONTINUED | OUTPATIENT
Start: 2018-04-23 | End: 2018-04-25 | Stop reason: HOSPADM

## 2018-04-23 RX ORDER — IBUPROFEN 200 MG
24 TABLET ORAL
Status: DISCONTINUED | OUTPATIENT
Start: 2018-04-23 | End: 2018-04-25 | Stop reason: HOSPADM

## 2018-04-23 RX ORDER — ASPIRIN 81 MG/1
81 TABLET ORAL DAILY
Status: DISCONTINUED | OUTPATIENT
Start: 2018-04-24 | End: 2018-04-25 | Stop reason: HOSPADM

## 2018-04-23 RX ORDER — ONDANSETRON 4 MG/1
4 TABLET, FILM COATED ORAL 3 TIMES DAILY PRN
Status: ON HOLD | COMMUNITY
End: 2018-10-17 | Stop reason: HOSPADM

## 2018-04-23 RX ORDER — PANTOPRAZOLE SODIUM 40 MG/1
40 TABLET, DELAYED RELEASE ORAL DAILY
Status: DISCONTINUED | OUTPATIENT
Start: 2018-04-24 | End: 2018-04-25 | Stop reason: HOSPADM

## 2018-04-23 RX ORDER — SODIUM CHLORIDE 9 MG/ML
INJECTION, SOLUTION INTRAVENOUS CONTINUOUS
Status: DISCONTINUED | OUTPATIENT
Start: 2018-04-23 | End: 2018-04-24

## 2018-04-23 RX ADMIN — SODIUM CHLORIDE: 0.9 INJECTION, SOLUTION INTRAVENOUS at 08:04

## 2018-04-23 RX ADMIN — ACETAMINOPHEN 650 MG: 325 TABLET ORAL at 11:04

## 2018-04-23 RX ADMIN — SODIUM CHLORIDE 1000 ML: 0.9 INJECTION, SOLUTION INTRAVENOUS at 05:04

## 2018-04-23 RX ADMIN — IOHEXOL 100 ML: 350 INJECTION, SOLUTION INTRAVENOUS at 03:04

## 2018-04-23 RX ADMIN — ONDANSETRON 4 MG: 2 INJECTION, SOLUTION INTRAMUSCULAR; INTRAVENOUS at 11:04

## 2018-04-23 RX ADMIN — ESCITALOPRAM 20 MG: 20 TABLET, FILM COATED ORAL at 09:04

## 2018-04-23 RX ADMIN — SODIUM CHLORIDE 1000 ML: 0.9 INJECTION, SOLUTION INTRAVENOUS at 03:04

## 2018-04-23 RX ADMIN — HYDROCODONE BITARTRATE AND ACETAMINOPHEN 1 TABLET: 7.5; 325 TABLET ORAL at 06:04

## 2018-04-23 RX ADMIN — SODIUM CHLORIDE 1000 ML: 0.9 INJECTION, SOLUTION INTRAVENOUS at 06:04

## 2018-04-23 NOTE — ED NOTES
.Pt admitted to room 301. Pt transferred via stretcher on tele & 02 by nurse in no distress. VSS. Bedside report given to MICHAEL Robert.

## 2018-04-23 NOTE — ED TRIAGE NOTES
57 y.o. female presents to ER ED 04/ED 04   Chief Complaint   Patient presents with    Chest Pain   [facility] c/o chest pain, SOB, dizziness, nausea, and neck pain. No acute distress noted.
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
## 2018-04-24 PROBLEM — F32.1 CURRENT MODERATE EPISODE OF MAJOR DEPRESSIVE DISORDER WITHOUT PRIOR EPISODE: Status: ACTIVE | Noted: 2018-04-24

## 2018-04-24 PROBLEM — R53.81 DEBILITY: Status: ACTIVE | Noted: 2018-04-24

## 2018-04-24 LAB
ALBUMIN SERPL BCP-MCNC: 3 G/DL
ALP SERPL-CCNC: 68 U/L
ALT SERPL W/O P-5'-P-CCNC: 15 U/L
ANION GAP SERPL CALC-SCNC: 7 MMOL/L
AST SERPL-CCNC: 17 U/L
BASOPHILS # BLD AUTO: 0.02 K/UL
BASOPHILS NFR BLD: 0.2 %
BILIRUB SERPL-MCNC: 0.3 MG/DL
BUN SERPL-MCNC: 27 MG/DL
CALCIUM SERPL-MCNC: 8.8 MG/DL
CHLORIDE SERPL-SCNC: 107 MMOL/L
CO2 SERPL-SCNC: 26 MMOL/L
CREAT SERPL-MCNC: 0.8 MG/DL
DIFFERENTIAL METHOD: ABNORMAL
EOSINOPHIL # BLD AUTO: 0.4 K/UL
EOSINOPHIL NFR BLD: 3.4 %
ERYTHROCYTE [DISTWIDTH] IN BLOOD BY AUTOMATED COUNT: 16.3 %
EST. GFR  (AFRICAN AMERICAN): >60 ML/MIN/1.73 M^2
EST. GFR  (NON AFRICAN AMERICAN): >60 ML/MIN/1.73 M^2
ESTIMATED AVG GLUCOSE: 174 MG/DL
GLUCOSE SERPL-MCNC: 121 MG/DL
HBA1C MFR BLD HPLC: 7.7 %
HCT VFR BLD AUTO: 33.8 %
HGB BLD-MCNC: 10.4 G/DL
LACTATE SERPL-SCNC: 0.8 MMOL/L
LACTATE SERPL-SCNC: 0.9 MMOL/L
LYMPHOCYTES # BLD AUTO: 5.8 K/UL
LYMPHOCYTES NFR BLD: 53.2 %
MCH RBC QN AUTO: 29.2 PG
MCHC RBC AUTO-ENTMCNC: 30.8 G/DL
MCV RBC AUTO: 95 FL
MONOCYTES # BLD AUTO: 1.1 K/UL
MONOCYTES NFR BLD: 10 %
NEUTROPHILS # BLD AUTO: 3.6 K/UL
NEUTROPHILS NFR BLD: 33.2 %
PATH REV BLD -IMP: NORMAL
PLATELET # BLD AUTO: 200 K/UL
PMV BLD AUTO: 13.9 FL
POCT GLUCOSE: 138 MG/DL (ref 70–110)
POCT GLUCOSE: 165 MG/DL (ref 70–110)
POCT GLUCOSE: 201 MG/DL (ref 70–110)
POTASSIUM SERPL-SCNC: 5.4 MMOL/L
PROT SERPL-MCNC: 6.3 G/DL
RBC # BLD AUTO: 3.56 M/UL
SODIUM SERPL-SCNC: 140 MMOL/L
TROPONIN I SERPL DL<=0.01 NG/ML-MCNC: <0.006 NG/ML
TSH SERPL DL<=0.005 MIU/L-ACNC: 1.66 UIU/ML
VALPROATE SERPL-MCNC: 19.8 UG/ML
WBC # BLD AUTO: 10.97 K/UL

## 2018-04-24 PROCEDURE — 85025 COMPLETE CBC W/AUTO DIFF WBC: CPT

## 2018-04-24 PROCEDURE — G0378 HOSPITAL OBSERVATION PER HR: HCPCS

## 2018-04-24 PROCEDURE — 99232 SBSQ HOSP IP/OBS MODERATE 35: CPT | Mod: ,,, | Performed by: INTERNAL MEDICINE

## 2018-04-24 PROCEDURE — 80053 COMPREHEN METABOLIC PANEL: CPT

## 2018-04-24 PROCEDURE — 94660 CPAP INITIATION&MGMT: CPT

## 2018-04-24 PROCEDURE — 25000003 PHARM REV CODE 250: Performed by: NURSE PRACTITIONER

## 2018-04-24 PROCEDURE — 96376 TX/PRO/DX INJ SAME DRUG ADON: CPT

## 2018-04-24 PROCEDURE — 25000242 PHARM REV CODE 250 ALT 637 W/ HCPCS: Performed by: INTERNAL MEDICINE

## 2018-04-24 PROCEDURE — 83605 ASSAY OF LACTIC ACID: CPT | Mod: 91

## 2018-04-24 PROCEDURE — 27000221 HC OXYGEN, UP TO 24 HOURS

## 2018-04-24 PROCEDURE — 94761 N-INVAS EAR/PLS OXIMETRY MLT: CPT

## 2018-04-24 PROCEDURE — 84484 ASSAY OF TROPONIN QUANT: CPT

## 2018-04-24 PROCEDURE — 36415 COLL VENOUS BLD VENIPUNCTURE: CPT

## 2018-04-24 PROCEDURE — G8978 MOBILITY CURRENT STATUS: HCPCS | Mod: CK

## 2018-04-24 PROCEDURE — 25000003 PHARM REV CODE 250: Performed by: SURGERY

## 2018-04-24 PROCEDURE — 94640 AIRWAY INHALATION TREATMENT: CPT

## 2018-04-24 PROCEDURE — 99900035 HC TECH TIME PER 15 MIN (STAT)

## 2018-04-24 PROCEDURE — 80164 ASSAY DIPROPYLACETIC ACD TOT: CPT

## 2018-04-24 PROCEDURE — 63600175 PHARM REV CODE 636 W HCPCS: Performed by: SURGERY

## 2018-04-24 PROCEDURE — 93005 ELECTROCARDIOGRAM TRACING: CPT

## 2018-04-24 PROCEDURE — 82962 GLUCOSE BLOOD TEST: CPT

## 2018-04-24 PROCEDURE — 84443 ASSAY THYROID STIM HORMONE: CPT

## 2018-04-24 PROCEDURE — 96361 HYDRATE IV INFUSION ADD-ON: CPT

## 2018-04-24 PROCEDURE — G8979 MOBILITY GOAL STATUS: HCPCS | Mod: CJ

## 2018-04-24 PROCEDURE — 97161 PT EVAL LOW COMPLEX 20 MIN: CPT

## 2018-04-24 PROCEDURE — 84484 ASSAY OF TROPONIN QUANT: CPT | Mod: 91

## 2018-04-24 PROCEDURE — 97116 GAIT TRAINING THERAPY: CPT

## 2018-04-24 RX ORDER — ALBUTEROL SULFATE 2.5 MG/.5ML
2.5 SOLUTION RESPIRATORY (INHALATION) EVERY 6 HOURS PRN
Status: DISCONTINUED | OUTPATIENT
Start: 2018-04-24 | End: 2018-04-25 | Stop reason: HOSPADM

## 2018-04-24 RX ORDER — PRAVASTATIN SODIUM 20 MG/1
20 TABLET ORAL NIGHTLY
Status: DISCONTINUED | OUTPATIENT
Start: 2018-04-24 | End: 2018-04-25 | Stop reason: HOSPADM

## 2018-04-24 RX ORDER — HYDROCODONE BITARTRATE AND ACETAMINOPHEN 7.5; 325 MG/1; MG/1
1 TABLET ORAL EVERY 8 HOURS PRN
Status: DISCONTINUED | OUTPATIENT
Start: 2018-04-24 | End: 2018-04-25 | Stop reason: HOSPADM

## 2018-04-24 RX ORDER — BUSPIRONE HYDROCHLORIDE 10 MG/1
10 TABLET ORAL 3 TIMES DAILY
Status: DISCONTINUED | OUTPATIENT
Start: 2018-04-24 | End: 2018-04-25 | Stop reason: HOSPADM

## 2018-04-24 RX ADMIN — SODIUM CHLORIDE: 0.9 INJECTION, SOLUTION INTRAVENOUS at 05:04

## 2018-04-24 RX ADMIN — PRAVASTATIN SODIUM 20 MG: 20 TABLET ORAL at 09:04

## 2018-04-24 RX ADMIN — ASPIRIN 81 MG: 81 TABLET, COATED ORAL at 07:04

## 2018-04-24 RX ADMIN — HYDROCODONE BITARTRATE AND ACETAMINOPHEN 1 TABLET: 7.5; 325 TABLET ORAL at 05:04

## 2018-04-24 RX ADMIN — PANTOPRAZOLE SODIUM 40 MG: 40 TABLET, DELAYED RELEASE ORAL at 07:04

## 2018-04-24 RX ADMIN — ESCITALOPRAM 20 MG: 20 TABLET, FILM COATED ORAL at 09:04

## 2018-04-24 RX ADMIN — BUSPIRONE HYDROCHLORIDE 10 MG: 10 TABLET ORAL at 09:04

## 2018-04-24 RX ADMIN — METOPROLOL SUCCINATE 12.5 MG: 25 TABLET, EXTENDED RELEASE ORAL at 01:04

## 2018-04-24 RX ADMIN — HYDROCODONE BITARTRATE AND ACETAMINOPHEN 1 TABLET: 7.5; 325 TABLET ORAL at 11:04

## 2018-04-24 RX ADMIN — BUSPIRONE HYDROCHLORIDE 10 MG: 10 TABLET ORAL at 02:04

## 2018-04-24 RX ADMIN — ONDANSETRON 4 MG: 2 INJECTION, SOLUTION INTRAMUSCULAR; INTRAVENOUS at 07:04

## 2018-04-24 RX ADMIN — LEVOTHYROXINE SODIUM 100 MCG: 100 TABLET ORAL at 05:04

## 2018-04-24 RX ADMIN — ACETAMINOPHEN 650 MG: 325 TABLET ORAL at 07:04

## 2018-04-24 RX ADMIN — ALBUTEROL SULFATE 2.5 MG: 2.5 SOLUTION RESPIRATORY (INHALATION) at 01:04

## 2018-04-24 NOTE — ASSESSMENT & PLAN NOTE
Serial enzymes negative, EKG stable  Cards consulted '  6/27/2017 Echo     1 - Normal left ventricular systolic function (EF 60-65%).     2 - Normal left ventricular diastolic function.     3 - Normal right ventricular systolic function

## 2018-04-24 NOTE — HPI
"56 YO Obese  Nursing home resident notes yesterday she began  feeling strange, "couldnt breath" ; felt like she was going to pass out. This occurred just after lunch while sitting down. She ambulated to her room with Walker and developed Chest pain. She notes weakness and dizziness lasted for a while. She denies change in any meds of late. She reports increased depression of late bc her fiance' has been really ill; reports she has been staying in bed and not eating much at nursing home. Also c/o anxiety and thinks symptoms due to anxiety. ER Labs revealed ; BUN 41, creat 1.3 , K 5.3, lactic acid 3.6 (1.4 this am). D- dimer elevated 0.74. Subsequent CTA negative for PE.  U/Ashowed no signs of infection. Troponin I negative x 3; EKG stable; no acute changes      This am c/o abdominal  pain.   "
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
## 2018-04-24 NOTE — ASSESSMENT & PLAN NOTE
, 138, 184   Resume meal time insulin and reported home levemir; bs well controlled for now will monitor bs/ correctional scale
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
## 2018-04-24 NOTE — HPI
57 year old female with history of morbid obesity, HTN, DM2, pulmonary hypertension, presented to ER with c/o nonexertional CP described as pressure with radiation to bilateral arms and neck associated with SOB and rated as 6/10 in severity. Jocelyn normal times 3 sets. EKG normal

## 2018-04-24 NOTE — PLAN OF CARE
Plan of care discussed with patient. She will discharge to [facility] [place] probably tomorrow. She has concerns for her fiancee who is also here. Will make arrangements for them to visit.
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
## 2018-04-24 NOTE — PLAN OF CARE
Problem: Patient Care Overview  Goal: Plan of Care Review  Outcome: Ongoing (interventions implemented as appropriate)  Patient aware of plan of care. Patient admitted with dehydration and rule out MI. VS stable, afebrile. C/o headache and nausea during night, relieved with tylenol and IV zofran. IV fluids continued. Troponin every 6 hours, negative thus far. Glycemic monitoring. Consult CIS this am. SCD's. 2L O2 per NC or CPAP worn throughout night, sating in upper 90's. Bed alarm engaged. Up with assistance to restroom. Patient free of falls/injuries this shift. Fall precautions maintained. No questions or concerns at this time. Agrees with plan of care.
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
## 2018-04-24 NOTE — PROGRESS NOTES
Staff Handoff  Patient to room 301 per stretcher with cardiac monitor in place. Bedside report received from MICHAEL Dennison. VS stable. Denies pain. IV fluids infusing without difficulty. Assessment complete per flowsheet. Call bell in reach.     Resident Handoff

## 2018-04-24 NOTE — PLAN OF CARE
Problem: Patient Care Overview  Goal: Plan of Care Review  Outcome: Ongoing (interventions implemented as appropriate)  Pt doing well. No question/concerns at this time. Agrees with poc. No /falls.  Pain relieved with po meds. Iv fluids stopped today d/t sob. Vitals were stable at this time. Patient feels fine now. Should go home tomorrow.

## 2018-04-25 VITALS
WEIGHT: 292 LBS | OXYGEN SATURATION: 97 % | RESPIRATION RATE: 20 BRPM | HEART RATE: 61 BPM | HEIGHT: 66 IN | TEMPERATURE: 97 F | BODY MASS INDEX: 46.93 KG/M2 | SYSTOLIC BLOOD PRESSURE: 138 MMHG | DIASTOLIC BLOOD PRESSURE: 72 MMHG

## 2018-04-25 PROBLEM — J18.9 HOSPITAL ACQUIRED PNA: Status: RESOLVED | Noted: 2017-09-30 | Resolved: 2018-04-25

## 2018-04-25 PROBLEM — S00.93XA HEAD CONTUSION: Status: RESOLVED | Noted: 2017-09-30 | Resolved: 2018-04-25

## 2018-04-25 PROBLEM — N17.9 ACUTE RENAL FAILURE: Status: RESOLVED | Noted: 2017-09-30 | Resolved: 2018-04-25

## 2018-04-25 PROBLEM — J42 CHRONIC BRONCHITIS: Status: RESOLVED | Noted: 2017-06-27 | Resolved: 2018-04-25
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
## 2018-04-25 PROBLEM — E87.5 HYPERKALEMIA: Status: ACTIVE | Noted: 2018-04-25

## 2018-04-25 PROBLEM — N30.00 ACUTE CYSTITIS WITHOUT HEMATURIA: Status: RESOLVED | Noted: 2017-10-01 | Resolved: 2018-04-25

## 2018-04-25 PROBLEM — J96.92 RESPIRATORY FAILURE WITH HYPERCAPNIA: Status: RESOLVED | Noted: 2017-06-26 | Resolved: 2018-04-25

## 2018-04-25 LAB
ALBUMIN SERPL BCP-MCNC: 3 G/DL
ALP SERPL-CCNC: 69 U/L
ALT SERPL W/O P-5'-P-CCNC: 16 U/L
ANION GAP SERPL CALC-SCNC: 6 MMOL/L
AST SERPL-CCNC: 16 U/L
BASOPHILS # BLD AUTO: 0.03 K/UL
BASOPHILS NFR BLD: 0.3 %
BILIRUB SERPL-MCNC: 0.3 MG/DL
BUN SERPL-MCNC: 15 MG/DL
CALCIUM SERPL-MCNC: 9.4 MG/DL
CHLORIDE SERPL-SCNC: 106 MMOL/L
CO2 SERPL-SCNC: 28 MMOL/L
CREAT SERPL-MCNC: 0.8 MG/DL
DIFFERENTIAL METHOD: ABNORMAL
EOSINOPHIL # BLD AUTO: 0.4 K/UL
EOSINOPHIL NFR BLD: 3.2 %
ERYTHROCYTE [DISTWIDTH] IN BLOOD BY AUTOMATED COUNT: 15.9 %
EST. GFR  (AFRICAN AMERICAN): >60 ML/MIN/1.73 M^2
EST. GFR  (NON AFRICAN AMERICAN): >60 ML/MIN/1.73 M^2
GLUCOSE SERPL-MCNC: 163 MG/DL
HCT VFR BLD AUTO: 32.9 %
HGB BLD-MCNC: 10.1 G/DL
LYMPHOCYTES # BLD AUTO: 6.4 K/UL
LYMPHOCYTES NFR BLD: 55.9 %
MCH RBC QN AUTO: 29.3 PG
MCHC RBC AUTO-ENTMCNC: 30.7 G/DL
MCV RBC AUTO: 95 FL
MONOCYTES # BLD AUTO: 1.2 K/UL
MONOCYTES NFR BLD: 10.4 %
NEUTROPHILS # BLD AUTO: 3.5 K/UL
NEUTROPHILS NFR BLD: 30.2 %
PLATELET # BLD AUTO: 191 K/UL
PMV BLD AUTO: 13.7 FL
POCT GLUCOSE: 148 MG/DL (ref 70–110)
POCT GLUCOSE: 273 MG/DL (ref 70–110)
POTASSIUM SERPL-SCNC: 5.4 MMOL/L
PROT SERPL-MCNC: 6.2 G/DL
RBC # BLD AUTO: 3.45 M/UL
SODIUM SERPL-SCNC: 140 MMOL/L
WBC # BLD AUTO: 11.49 K/UL

## 2018-04-25 PROCEDURE — 36415 COLL VENOUS BLD VENIPUNCTURE: CPT

## 2018-04-25 PROCEDURE — 96376 TX/PRO/DX INJ SAME DRUG ADON: CPT

## 2018-04-25 PROCEDURE — 85025 COMPLETE CBC W/AUTO DIFF WBC: CPT

## 2018-04-25 PROCEDURE — 99900035 HC TECH TIME PER 15 MIN (STAT)

## 2018-04-25 PROCEDURE — 96372 THER/PROPH/DIAG INJ SC/IM: CPT

## 2018-04-25 PROCEDURE — 94760 N-INVAS EAR/PLS OXIMETRY 1: CPT

## 2018-04-25 PROCEDURE — 82962 GLUCOSE BLOOD TEST: CPT

## 2018-04-25 PROCEDURE — 25000003 PHARM REV CODE 250: Performed by: SURGERY

## 2018-04-25 PROCEDURE — 80053 COMPREHEN METABOLIC PANEL: CPT

## 2018-04-25 PROCEDURE — G0378 HOSPITAL OBSERVATION PER HR: HCPCS

## 2018-04-25 PROCEDURE — 63600175 PHARM REV CODE 636 W HCPCS: Performed by: NURSE PRACTITIONER

## 2018-04-25 PROCEDURE — 27000221 HC OXYGEN, UP TO 24 HOURS

## 2018-04-25 PROCEDURE — 94660 CPAP INITIATION&MGMT: CPT

## 2018-04-25 PROCEDURE — 25000003 PHARM REV CODE 250: Performed by: NURSE PRACTITIONER

## 2018-04-25 PROCEDURE — 94640 AIRWAY INHALATION TREATMENT: CPT

## 2018-04-25 PROCEDURE — 63600175 PHARM REV CODE 636 W HCPCS: Performed by: SURGERY

## 2018-04-25 PROCEDURE — 25000242 PHARM REV CODE 250 ALT 637 W/ HCPCS: Performed by: INTERNAL MEDICINE

## 2018-04-25 PROCEDURE — 99239 HOSP IP/OBS DSCHRG MGMT >30: CPT | Mod: ,,, | Performed by: FAMILY MEDICINE

## 2018-04-25 RX ORDER — INSULIN ASPART 100 [IU]/ML
10 INJECTION, SOLUTION INTRAVENOUS; SUBCUTANEOUS ONCE
Status: COMPLETED | OUTPATIENT
Start: 2018-04-25 | End: 2018-04-25

## 2018-04-25 RX ORDER — CALCIUM CARBONATE 600 MG
600 TABLET ORAL 2 TIMES DAILY WITH MEALS
COMMUNITY
End: 2020-03-03

## 2018-04-25 RX ORDER — METFORMIN HYDROCHLORIDE 1000 MG/1
1000 TABLET ORAL 2 TIMES DAILY WITH MEALS
COMMUNITY
End: 2021-03-31 | Stop reason: DRUGHIGH

## 2018-04-25 RX ADMIN — ASPIRIN 81 MG: 81 TABLET, COATED ORAL at 08:04

## 2018-04-25 RX ADMIN — BUSPIRONE HYDROCHLORIDE 10 MG: 10 TABLET ORAL at 08:04

## 2018-04-25 RX ADMIN — ACETAMINOPHEN 650 MG: 325 TABLET ORAL at 11:04

## 2018-04-25 RX ADMIN — BUSPIRONE HYDROCHLORIDE 10 MG: 10 TABLET ORAL at 03:04

## 2018-04-25 RX ADMIN — HYDROCODONE BITARTRATE AND ACETAMINOPHEN 1 TABLET: 7.5; 325 TABLET ORAL at 08:04

## 2018-04-25 RX ADMIN — LEVOTHYROXINE SODIUM 100 MCG: 100 TABLET ORAL at 05:04

## 2018-04-25 RX ADMIN — HYDROCODONE BITARTRATE AND ACETAMINOPHEN 1 TABLET: 7.5; 325 TABLET ORAL at 12:04

## 2018-04-25 RX ADMIN — INSULIN ASPART 10 UNITS: 100 INJECTION, SOLUTION INTRAVENOUS; SUBCUTANEOUS at 11:04

## 2018-04-25 RX ADMIN — PANTOPRAZOLE SODIUM 40 MG: 40 TABLET, DELAYED RELEASE ORAL at 08:04

## 2018-04-25 RX ADMIN — ONDANSETRON 4 MG: 2 INJECTION, SOLUTION INTRAMUSCULAR; INTRAVENOUS at 01:04

## 2018-04-25 RX ADMIN — METOPROLOL SUCCINATE 12.5 MG: 25 TABLET, EXTENDED RELEASE ORAL at 08:04

## 2018-04-25 RX ADMIN — ALBUTEROL SULFATE 2.5 MG: 2.5 SOLUTION RESPIRATORY (INHALATION) at 02:04

## 2018-04-25 RX ADMIN — ONDANSETRON 4 MG: 2 INJECTION, SOLUTION INTRAMUSCULAR; INTRAVENOUS at 11:04
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
## 2018-04-25 NOTE — PLAN OF CARE
Made four attempts to place patient on CPAP. She refused each time and had an excuse. Fay Banda RN notified.

## 2018-04-25 NOTE — NURSING
Received call from Agueda from [facility]. Orders needing clarification:  Lispro dose, caltrate plus, depakote, lexapro, buspar metformin, januvia and levimir. clarifiction of accu checks and sliding scale with diet xavi. Reviewed paperwork of discharge summary over phone. Will follow up with MD and get back with you.

## 2018-04-25 NOTE — PROGRESS NOTES
"[facility] Medicine  Progress Note    Patient Name: Michelle Godfrey  MRN: [id number]  Patient Class: OP- Observation   Admission Date: 4/23/2018  Length of Stay: 0 days  Attending Physician: Janee Ness MD  Primary Care Provider: Jacy Garvey MD        Subjective:     Principal Problem:Dehydration    HPI:  58 YO Obese [facility] resident notes yesterday she began  feeling strange, "couldnt breath" ; felt like she was going to pass out. This occurred just after lunch while sitting down. She ambulated to her room with Walker and developed Chest pain. She notes weakness and dizziness lasted for a while. She denies change in any meds of late. She reports increased depression of late bc her fiance' has been really ill; reports she has been staying in bed and not eating much at nursing home. Also c/o anxiety and thinks symptoms due to anxiety. ER Labs revealed ; BUN 41, creat 1.3 , K 5.3, lactic acid 3.6 (1.4 this am). D- dimer elevated 0.74. Subsequent CTA negative for PE.  U/Ashowed no signs of infection. Troponin I negative x 3; EKG stable; no acute changes      This am c/o abdominal  pain.     Hospital Course:  4/25/18 C/o headache and nausea during night, relieved with hydrocodone and IV zofran. Still with multiple complaints this am including chronic low back pain.   2L O2 per NC or CPAP worn throughout night, sating in upper 90's. /74- 170-75 HR 60-70. Afebrile   IVFs- NS  @ 125ml /hr ; BUN 41>27>15, /Creat 1.3>0.8>0.8   WBC 14.99>10.97>11.49  Up with assistance to restroom. SCDs maintained   Chest pain that lead to admission is resolved.      Review of Systems   Constitutional: Negative for chills, fatigue and fever.   HENT: Negative for congestion, ear pain, postnasal drip, sinus pressure, sneezing and sore throat.    Eyes: Negative for discharge.   Respiratory: Positive for shortness of breath (yestereday). Negative for cough and chest tightness.    Cardiovascular: " Negative.    Gastrointestinal: Positive for abdominal pain. Negative for constipation, diarrhea, nausea and vomiting.   Genitourinary: Negative for difficulty urinating, flank pain and hematuria.   Musculoskeletal: Negative.  Negative for arthralgias and joint swelling.   Skin: Negative.    Neurological: Negative for dizziness, weakness, light-headedness and headaches.   Psychiatric/Behavioral: Negative for behavioral problems and confusion. The patient is nervous/anxious.      Objective:     Vital Signs (Most Recent):  Temp: 96.4 °F (35.8 °C) (04/25/18 0348)  Pulse: 63 (04/25/18 0600)  Resp: 18 (04/25/18 0348)  BP: (!) 170/75 (04/25/18 0348)  SpO2: 95 % (04/25/18 0348) Vital Signs (24h Range):  Temp:  [96.4 °F (35.8 °C)-97.5 °F (36.4 °C)] 96.4 °F (35.8 °C)  Pulse:  [63-78] 63  Resp:  [18] 18  SpO2:  [94 %-98 %] 95 %  BP: (128-170)/(66-78) 170/75     Weight: 132.5 kg (292 lb)  Body mass index is 47.13 kg/m².    Physical Exam   Constitutional: She is oriented to person, place, and time. She appears well-developed. No distress.   Morbidly obese   HENT:   Head: Normocephalic and atraumatic.   Bilateral ptosis   Eyes: Conjunctivae are normal. Pupils are equal, round, and reactive to light.   Neck: Normal range of motion. Neck supple. No thyromegaly present.   Cardiovascular: Normal rate, regular rhythm, normal heart sounds and intact distal pulses.    Pulmonary/Chest: Effort normal and breath sounds normal. No respiratory distress. She has no wheezes.   Upper airway 'wheezing'    Lungs very clear   Abdominal: Soft. Bowel sounds are normal. There is no tenderness.   Musculoskeletal: Normal range of motion. She exhibits no edema.   scds in place   Lymphadenopathy:     She has no cervical adenopathy.   Neurological: She is alert and oriented to person, place, and time.   Appears fatigued   Skin: Skin is warm and dry. No rash noted.   Psychiatric: She has a normal mood and affect. Her behavior is normal.   Nursing note and  vitals reviewed.        CRANIAL NERVES     CN III, IV, VI   Pupils are equal, round, and reactive to light.       Significant Labs:   CBC:     Recent Labs  Lab 18  1424 18  0333 18  0420   WBC 14.99* 10.97 11.49   HGB 10.3* 10.4* 10.1*   HCT 33.0* 33.8* 32.9*    200 191     CMP:     Recent Labs  Lab 18  1425 18  0333 18  0420    140 140   K 5.3* 5.4* 5.4*    107 106   CO2 28 26 28   * 121* 163*   BUN 41* 27* 15   CREATININE 1.3 0.8 0.8   CALCIUM 9.3 8.8 9.4   PROT 6.5 6.3 6.2   ALBUMIN 3.1* 3.0* 3.0*   BILITOT 0.2 0.3 0.3   ALKPHOS 77 68 69   AST 18 17 16   ALT 15 15 16   ANIONGAP 11 7* 6*   EGFRNONAA 45* >60 >60     Cardiac Markers:     Recent Labs  Lab 18  1424   BNP <10     Lactic Acid:     Recent Labs  Lab 18  2137 18  0333 18  0825   LACTATE 1.7 0.8 0.9     Magnesium:     Recent Labs  Lab 18  1425   MG 1.7     Troponin:     Recent Labs  Lab 18  2138 18  0333 18  0825   TROPONINI <0.006 <0.006 <0.006     TSH:     Recent Labs  Lab 18  1308   TSH 1.656     Urine Culture: No results for input(s): LABURIN in the last 48 hours.  Urine Studies:     Recent Labs  Lab 18  1623   COLORU Yellow   APPEARANCEUA Clear   PHUR 6.0   SPECGRAV 1.010   PROTEINUA Negative   GLUCUA Negative   KETONESU Negative   BILIRUBINUA Negative   OCCULTUA Negative   NITRITE Negative   UROBILINOGEN Negative   LEUKOCYTESUR Trace*   WBCUA 2     18 Valproic acid- 19.8  18 TSH 1.656     Significant Imagin18 CTA chest Limited study for the detection of pulmonary embolus secondary to respiratory motion artifact and beam hardening artifact.  No definite pulmonary embolus is identified through the proximal segmental level.    Pulmonary vascular congestion without overt pulmonary edema.    Prominence of the main pulmonary arteries raising the possibility for pulmonary arterial hypertension.  18 CXR - The lungs  are under expanded with crowding of the pulmonary vascularity.  There is fullness of the bilateral pulmonary kishore which could reflect prominent pulmonary vascularity versus lymphadenopathy.  The heart is normal in size.  The skeletal structures are intact.    Assessment/Plan:      * Dehydration    Much better; BUN and creat now stable  D/C IVFs          Hyperkalemia    Will resume lasix and lisinopril as well as home insulin.  This seems chronic          Debility    Consulting PT   ^OOB with assist   SCDs          Current moderate episode of major depressive disorder without prior episode      Continue lexapro and buspar   valproic acid 19.8  Resume Depakote          Chest pain      Serial enzymes negative, EKG stable  Cards consulted '  6/27/2017 Echo     1 - Normal left ventricular systolic function (EF 60-65%).     2 - Normal left ventricular diastolic function.     3 - Normal right ventricular systolic function         Essential hypertension      BP stable will resume all home meds        Morbid obesity with BMI of 45.0-49.9, adult    Nursing home pt- UP OOB with PT   Diabetic diet,           Acquired hypothyroidism    Continue levothyroxine 100mcg   TSH stable         COPD (chronic obstructive pulmonary disease)    Neb tx prn   O2 at Nursing home and CPAP          Uncontrolled type 2 diabetes mellitus with hyperglycemia, without long-term current use of insulin    4/24/18BS 121, 138, 184   4/25/18 , 165, 201- a little higher today - she is now consuming more food;   At nursing home she had Novolog mealtime SS and reports levemir (15U) but not listed on home meds   bs had been well controlled and had just been monitoring  bs/ correctional scale           Chronic respiratory failure with hypercapnia    Resume home o2 at d/c            VTE Risk Mitigation         Ordered     IP VTE HIGH RISK PATIENT  Once      04/23/18 1939     Place sequential compression device  Until discontinued      04/23/18 1939               Phyllis Campbell MD  Department of Hospital Medicine   [facility]

## 2018-04-25 NOTE — HOSPITAL COURSE
4/25/18 C/o headache and nausea during night, relieved with hydrocodone and IV zofran. Still with multiple complaints this am including chronic low back pain.   2L O2 per NC or CPAP worn throughout night, sating in upper 90's. /74- 170-75 HR 60-70. Afebrile   IVFs- NS  @ 125ml /hr ; BUN 41>27>15, /Creat 1.3>0.8>0.8   WBC 14.99>10.97>11.49  Up with assistance to restroom. SCDs maintained   Chest pain that lead to admission is resolved.

## 2018-04-25 NOTE — ASSESSMENT & PLAN NOTE
4/24/18BS 121, 138, 184 - well conrtolled - will monitor   No schedule insulin for now until improved po intake
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
## 2018-04-25 NOTE — NURSING
Called back RM, spoke with Agueda. informed her that lexapro and buspar is on discharge summary. Informed Agueda that once clarification of discharge meds were completed,  I would re-faxed orders.

## 2018-04-25 NOTE — ASSESSMENT & PLAN NOTE
4/24/18BS 121, 138, 184   4/25/18 , 165, 201- a little higher today - she is now consuming more food;   At nursing home she had Novolog mealtime SS and reports levemir (15U) but not listed on home meds   bs had been well controlled and had just been monitoring  bs/ correctional scale

## 2018-04-25 NOTE — PT/OT/SLP DISCHARGE
Physical Therapy Discharge Summary    Name: Michelle Godfrey  MRN: [id number]   Principal Problem: Chest pain     Patient Discharged from acute Physical Therapy on 2018.  Please refer to prior PT noted date on 2018 for functional status.     Assessment:     Patient has not met goals.    Objective:     GOALS:    Physical Therapy Goals     Not on file          Multidisciplinary Problems (Resolved)        Problem: Physical Therapy Goal    Goal Priority Disciplines Outcome Goal Variances Interventions   Physical Therapy Goal   (Resolved)     PT/OT, PT Outcome(s) achieved     Description:  Goals to be met by: 2018     Patient will increase functional independence with mobility by performin. Supine to sit with [place] -- NOT MET  2. Sit to supine with [place] -- NOT MET  3. Rolling to Left and Right with [place]. -- NOT MET  4. Sit to stand transfer with Supervision -- NOT MET  5. Bed to chair transfer with Supervision using Rolling Walker -- NOT MET  6. Gait  x 50 feet with Supervision using Rolling Walker. -- NOT MET                       Reasons for Discontinuation of Therapy Services  Transfer to alternate level of care.      Plan:     Patient Discharged to: [facility].    Criselda Cowan, PT  2018

## 2018-04-25 NOTE — PLAN OF CARE
04/25/18 1053   Discharge Reassessment   Assessment Type Discharge Planning Reassessment     Coordination of care for pt to return to [facility]. Berkley contacted [facility] and left a message for call back for Agueda. Berkley informed  that d/c summary was faxed to [facility]. Berkley will continue to f/up.

## 2018-04-25 NOTE — NURSING
"spoke with Agueda at [facility]. Discharge order sheet and AVS faxed to [facility]. Asked to give report, agueda states" they are getting report on admit right now. I will have them call you back. Unit number given .  "

## 2018-04-25 NOTE — NURSING
"Nursing home order sheet and discharge summary faxed to [facility] @ [phone]. . Spoke with KAITLIN Cortez states" I faxed information already to them". Will continue to f/u on discharge progress.   "
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
## 2018-04-25 NOTE — NURSING
"Report given to Janee Mcdowell LPN. States" orders not received."  I asked for alternate number to fax orders. Number given [phone]  "

## 2018-04-25 NOTE — NURSING
Called KELIN Vázquez informed her that I left message for dr. Campbell about AVS. Discussed clarifications of discharge orders.

## 2018-04-25 NOTE — NURSING
Dr. Campbell notified of medications needing clarification. Discussed home medications, diet order, accu checks with sliding scale.

## 2018-04-25 NOTE — DISCHARGE SUMMARY
"[facility] Medicine  Discharge Summary      Patient Name: Michelle Godfrey  MRN: [id number]  Admission Date: 4/23/2018  Hospital Length of Stay: 0 days  Discharge Date and Time:  04/25/2018 10:28 AM  Attending Physician: Janee Ness MD   Discharging Provider: Kathie Warren NP  Primary Care Provider: Jacy Garvey MD      HPI:   56 YO Obese [facility] resident notes yesterday she began  feeling strange, "couldnt breath" ; felt like she was going to pass out. This occurred just after lunch while sitting down. She ambulated to her room with Walker and developed Chest pain. She notes weakness and dizziness lasted for a while. She denies change in any meds of late. She reports increased depression of late bc her fiance' has been really ill; reports she has been staying in bed and not eating much at nursing home. Also c/o anxiety and thinks symptoms due to anxiety. ER Labs revealed ; BUN 41, creat 1.3 , K 5.3, lactic acid 3.6 (1.4 this am). D- dimer elevated 0.74. Subsequent CTA negative for PE.  U/Ashowed no signs of infection. Troponin I negative x 3; EKG stable; no acute changes      This am c/o abdominal  pain.     * No surgery found *      Hospital Course:   4/25/18 C/o headache and nausea during night, relieved with hydrocodone and IV zofran. Still with multiple complaints this am including chronic low back pain.   2L O2 per NC or CPAP worn throughout night, sating in upper 90's. /74- 170-75 HR 60-70. Afebrile   IVFs- NS  @ 125ml /hr ; BUN 41>27>15, /Creat 1.3>0.8>0.8   WBC 14.99>10.97>11.49  Up with assistance to restroom. SCDs maintained   Chest pain that lead to admission is resolved.     Consults:   Consults         Status Ordering Provider     Inpatient consult to [facility]iology[facility]  Once     Provider:  Jose Pino MD    Acknowledged DANTE JOYA          No new Assessment & Plan notes have been filed under this hospital service since the last note was " generated.  Service: Hospital Medicine    Final Active Diagnoses:    Diagnosis Date Noted POA    PRINCIPAL PROBLEM:  Chest pain [R07.9]  Yes    Hyperkalemia [E87.5] 04/25/2018 Yes    Current moderate episode of major depressive disorder without prior episode [F32.1] 04/24/2018 Yes    Debility [R53.81] 04/24/2018 Yes    Dehydration [E86.0] 04/23/2018 Yes    Essential hypertension [I10] 11/26/2015 Yes    Morbid obesity with BMI of 45.0-49.9, adult [E66.01, Z68.42] 11/25/2015 Not Applicable    Uncontrolled type 2 diabetes mellitus with hyperglycemia, without long-term current use of insulin [E11.65] 11/25/2015 Yes    COPD (chronic obstructive pulmonary disease) [J44.9] 11/25/2015 Yes    Acquired hypothyroidism [E03.9] 11/25/2015 Yes    Chronic respiratory failure with hypercapnia [J96.12] 11/25/2015 Yes      Problems Resolved During this Admission:    Diagnosis Date Noted Date Resolved POA       Discharged Condition: good    Disposition: Long Term Care    Follow Up:  Follow-up Information     Jacy Garvey MD.    Specialty:  Family Medicine  Why:  outpatient services  Contact information:  [address]  [place] [address] [phone]                 Patient Instructions:   No discharge procedures on file.    Significant Diagnostic Studies:   Recent Labs  Lab 04/23/18  1424   BNP <10      Lactic Acid:      Recent Labs  Lab 04/23/18  2137 04/24/18  0333 04/24/18  0825   LACTATE 1.7 0.8 0.9      Magnesium:      Recent Labs  Lab 04/23/18  1425   MG 1.7      Troponin:      Recent Labs  Lab 04/23/18  2138 04/24/18  0333 04/24/18  0825   TROPONINI <0.006 <0.006 <0.006      TSH:      Recent Labs  Lab 04/24/18  1308   TSH 1.656      Urine Culture: No results for input(s): LABURIN in the last 48 hours.  Urine Studies:      Recent Labs  Lab 04/23/18  1623   COLORU Yellow   APPEARANCEUA Clear   PHUR 6.0   SPECGRAV 1.010   PROTEINUA Negative   GLUCUA Negative   KETONESU Negative   BILIRUBINUA Negative   OCCULTUA  Negative   NITRITE Negative   UROBILINOGEN Negative   LEUKOCYTESUR Trace*   WBCUA 2      18 Valproic acid- 19.8  18 TSH 1.656      Significant Imagin18 CTA chest Limited study for the detection of pulmonary embolus secondary to respiratory motion artifact and beam hardening artifact.  No definite pulmonary embolus is identified through the proximal segmental level.    Pulmonary vascular congestion without overt pulmonary edema.    Prominence of the main pulmonary arteries raising the possibility for pulmonary arterial hypertension.  18 CXR - The lungs are under expanded with crowding of the pulmonary vascularity.  There is fullness of the bilateral pulmonary kishore which could reflect prominent pulmonary vascularity versus lymphadenopathy.  The heart is normal in size.  The skeletal structures are intact.      Pending Diagnostic Studies:     None         Medications:  Reconciled Home Medications:      Medication List      CONTINUE taking these medications    albuterol 2.5 mg /3 mL (0.083 %) nebulizer solution  Commonly known as:  PROVENTIL  Take 2.5 mg by nebulization every 6 (six) hours as needed for Wheezing.     allopurinol 100 MG tablet  Commonly known as:  ZYLOPRIM  Take 300 mg by mouth 2 (two) times daily.     aspirin 81 MG EC tablet  Commonly known as:  ECOTRIN  Take 81 mg by mouth once daily.     * BD ULTRA FINE LANCETS 33 gauge Misc  Generic drug:  lancets     * ULTRA THIN LANCETS 30 gauge Misc  Generic drug:  lancets     budesonide-formoterol 160-4.5 mcg 160-4.5 mcg/actuation Hfaa  Commonly known as:  SYMBICORT  Inhale 2 puffs into the lungs every 12 (twelve) hours.     busPIRone 10 MG tablet  Commonly known as:  BUSPAR  Take 10 mg by mouth 3 (three) times daily.     divalproex  MG Tb24  Commonly known as:  DEPAKOTE  Take 3 tablets (1,500 mg total) by mouth 2 (two) times daily.     escitalopram oxalate 10 MG tablet  Commonly known as:  LEXAPRO  Take 20 mg by mouth every  evening.     furosemide 20 MG tablet  Commonly known as:  LASIX  Take 0.5 tablets (10 mg total) by mouth 2 (two) times daily.     gabapentin 300 MG capsule  Commonly known as:  NEURONTIN  Take 300 mg by mouth 3 (three) times daily.     hydrocodone-acetaminophen 7.5-325mg 7.5-325 mg per tablet  Commonly known as:  NORCO  Take 1 tablet by mouth every 8 (eight) hours as needed for Pain.     insulin lispro 100 unit/mL injection  Commonly known as:  HUMALOG  Inject into the skin 3 (three) times daily before meals.     ipratropium 0.02 % nebulizer solution  Commonly known as:  ATROVENT  Take 500 mcg by nebulization 4 (four) times daily. Rescue     levothyroxine 100 MCG tablet  Commonly known as:  SYNTHROID  Take 1 tablet (100 mcg total) by mouth before breakfast.     lisinopril 10 MG tablet  Take 10 mg by mouth once daily.     magnesium oxide 400 mg tablet  Commonly known as:  MAG-OX  Take 400 mg by mouth 2 (two) times daily.     ondansetron 4 MG tablet  Commonly known as:  ZOFRAN  Take 4 mg by mouth 3 (three) times daily as needed for Nausea.     pantoprazole 40 MG tablet  Commonly known as:  PROTONIX  Take 40 mg by mouth nightly.     pravastatin 20 MG tablet  Commonly known as:  PRAVACHOL  Take 20 mg by mouth every evening.     [employer] BLOOD GLUCOSE SYSTM kit  Generic drug:  blood-glucose meter        * This list has 2 medication(s) that are the same as other medications prescribed for you. Read the directions carefully, and ask your doctor or other care provider to review them with you.                Indwelling Lines/Drains at time of discharge:   Lines/Drains/Airways          No matching active lines, drains, or airways          Time spent on the discharge of patient: 15  minutes  Patient was seen and examined on the date of discharge and determined to be suitable for discharge.         Kathie Warren, NP  Department of Hospital Medicine  [facility]

## 2018-04-25 NOTE — PROGRESS NOTES
Staff Handoff    Bedside report received from MICHAEL Robert. Patient lying in bed. No distress noted. 2L O2 per NC in use. Denies pain. Assessment complete per flowhseet. Call bell in reach.   Resident Handoff

## 2018-04-25 NOTE — NURSING
"spoke with SERGIO Hendricks at . States" we have not received patient orders. What # did you fax it to ?" I reported to [phone] & [phone] . Informed her that I gave orders to my supervisor, lenore gupta to fax from [facility] office. I also informed Ruthie that I had verbally clarified orders in question  With Agueda larios on phone. Patient is ready for P/U. Orders updated and ready in envelop.   "

## 2018-04-25 NOTE — SUBJECTIVE & OBJECTIVE
Review of Systems   Constitutional: Negative for chills, fatigue and fever.   HENT: Negative for congestion, ear pain, postnasal drip, sinus pressure, sneezing and sore throat.    Eyes: Negative for discharge.   Respiratory: Positive for shortness of breath (yestereday). Negative for cough and chest tightness.    Cardiovascular: Negative.    Gastrointestinal: Positive for abdominal pain. Negative for constipation, diarrhea, nausea and vomiting.   Genitourinary: Negative for difficulty urinating, flank pain and hematuria.   Musculoskeletal: Negative.  Negative for arthralgias and joint swelling.   Skin: Negative.    Neurological: Negative for dizziness, weakness, light-headedness and headaches.   Psychiatric/Behavioral: Negative for behavioral problems and confusion. The patient is nervous/anxious.      Objective:     Vital Signs (Most Recent):  Temp: 96.4 °F (35.8 °C) (04/25/18 0348)  Pulse: 63 (04/25/18 0600)  Resp: 18 (04/25/18 0348)  BP: (!) 170/75 (04/25/18 0348)  SpO2: 95 % (04/25/18 0348) Vital Signs (24h Range):  Temp:  [96.4 °F (35.8 °C)-97.5 °F (36.4 °C)] 96.4 °F (35.8 °C)  Pulse:  [63-78] 63  Resp:  [18] 18  SpO2:  [94 %-98 %] 95 %  BP: (128-170)/(66-78) 170/75     Weight: 132.5 kg (292 lb)  Body mass index is 47.13 kg/m².    Physical Exam   Constitutional: She is oriented to person, place, and time. She appears well-developed. No distress.   Morbidly obese   HENT:   Head: Normocephalic and atraumatic.   Bilateral ptosis   Eyes: Conjunctivae are normal. Pupils are equal, round, and reactive to light.   Neck: Normal range of motion. Neck supple. No thyromegaly present.   Cardiovascular: Normal rate, regular rhythm, normal heart sounds and intact distal pulses.    Pulmonary/Chest: Effort normal and breath sounds normal. No respiratory distress. She has no wheezes.   Upper airway 'wheezing'    Lungs very clear   Abdominal: Soft. Bowel sounds are normal. There is no tenderness.   Musculoskeletal: Normal  range of motion. She exhibits no edema.   scds in place   Lymphadenopathy:     She has no cervical adenopathy.   Neurological: She is alert and oriented to person, place, and time.   Appears fatigued   Skin: Skin is warm and dry. No rash noted.   Psychiatric: She has a normal mood and affect. Her behavior is normal.   Nursing note and vitals reviewed.        CRANIAL NERVES     CN III, IV, VI   Pupils are equal, round, and reactive to light.       Significant Labs:   CBC:     Recent Labs  Lab 18  0333 18  0420   WBC 14.99* 10.97 11.49   HGB 10.3* 10.4* 10.1*   HCT 33.0* 33.8* 32.9*    200 191     CMP:     Recent Labs  Lab 18  0420    140 140   K 5.3* 5.4* 5.4*    107 106   CO2 28 26 28   * 121* 163*   BUN 41* 27* 15   CREATININE 1.3 0.8 0.8   CALCIUM 9.3 8.8 9.4   PROT 6.5 6.3 6.2   ALBUMIN 3.1* 3.0* 3.0*   BILITOT 0.2 0.3 0.3   ALKPHOS 77 68 69   AST 18 17 16   ALT 15 15 16   ANIONGAP 11 7* 6*   EGFRNONAA 45* >60 >60     Cardiac Markers:     Recent Labs  Lab 18  1424   BNP <10     Lactic Acid:     Recent Labs  Lab 18  0333 18  0825   LACTATE 1.7 0.8 0.9     Magnesium:     Recent Labs  Lab 18  1425   MG 1.7     Troponin:     Recent Labs  Lab 18  0333 18  0825   TROPONINI <0.006 <0.006 <0.006     TSH:     Recent Labs  Lab 18  1308   TSH 1.656     Urine Culture: No results for input(s): LABURIN in the last 48 hours.  Urine Studies:     Recent Labs  Lab 18  1623   COLORU Yellow   APPEARANCEUA Clear   PHUR 6.0   SPECGRAV 1.010   PROTEINUA Negative   GLUCUA Negative   KETONESU Negative   BILIRUBINUA Negative   OCCULTUA Negative   NITRITE Negative   UROBILINOGEN Negative   LEUKOCYTESUR Trace*   WBCUA 2     18 Valproic acid- 19.8  18 TSH 1.656     Significant Imagin18 CTA chest Limited study for the detection of pulmonary embolus secondary  to respiratory motion artifact and beam hardening artifact.  No definite pulmonary embolus is identified through the proximal segmental level.    Pulmonary vascular congestion without overt pulmonary edema.    Prominence of the main pulmonary arteries raising the possibility for pulmonary arterial hypertension.  4/23/18 CXR - The lungs are under expanded with crowding of the pulmonary vascularity.  There is fullness of the bilateral pulmonary kishore which could reflect prominent pulmonary vascularity versus lymphadenopathy.  The heart is normal in size.  The skeletal structures are intact.

## 2018-04-25 NOTE — PLAN OF CARE
04/25/18 1011   Discharge Assessment   Assessment Type Discharge Planning Reassessment     Patient will discharge home to [facility] today. She has no issues or concerns except that her fiancee will remain here for further treatment.

## 2018-04-25 NOTE — PLAN OF CARE
Problem: Patient Care Overview  Goal: Plan of Care Review  Outcome: Ongoing (interventions implemented as appropriate)  Patient aware of plan of care. C/o headache and nausea during night, relieved with hydrocodone and IV zofran. Glycemic monitoring. SCD's. 2L O2 per NC or CPAP worn throughout night, sating in upper 90's. Bed alarm engaged. Up with assistance to restroom. Patient free of falls/injuries this shift. Fall precautions maintained. No questions or concerns at this time. Agrees with plan of care.

## 2018-04-28 LAB — BACTERIA BLD CULT: NORMAL

## 2018-05-03 NOTE — PLAN OF CARE
05/03/18 0955   Final Note   Assessment Type Final Discharge Note   Discharge Disposition SNF  (The pt is a resident at [facility]. )   What phone number can be called within the next 1-3 days to see how you are doing after discharge? [phone]   Hospital Follow Up  Appt(s) scheduled? Yes   Discharge plans and expectations educations in teach back method with documentation complete? Yes   Right Care Referral Info   Post Acute Recommendation Home-care

## 2018-06-25 ENCOUNTER — HOSPITAL ENCOUNTER (EMERGENCY)
Facility: HOSPITAL | Age: 58
Discharge: HOME | End: 2018-06-25
Attending: SURGERY
Payer: MEDICAID

## 2018-06-25 VITALS
BODY MASS INDEX: 47.94 KG/M2 | DIASTOLIC BLOOD PRESSURE: 58 MMHG | HEART RATE: 78 BPM | SYSTOLIC BLOOD PRESSURE: 118 MMHG | OXYGEN SATURATION: 95 % | WEIGHT: 293 LBS | RESPIRATION RATE: 16 BRPM | TEMPERATURE: 97 F
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
## 2018-06-25 PROCEDURE — 99284 EMERGENCY DEPT VISIT MOD MDM: CPT | Mod: 25

## 2018-06-25 NOTE — ED TRIAGE NOTES
C/O fell out of bed at [facility].  Hematoma noted to forehead.  C/O pain neck, shoulders and left knee.
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
## 2018-06-30 NOTE — PHYSICIAN QUERY
PT Name: Michelle Godfrey  MR #: [id number]    Physician Query Form - Neurological Condition Clarification       CDS/: Rachel Ochoa               Contact information:[phone]  This form is a permanent document in the medical record.     Query Date: June 26, 2017    By submitting this query, we are merely seeking further clarification of documentation. Please utilize your independent clinical judgment when addressing the question(s) below.    The Medical record contains the following:   Indicators   Supporting Clinical Findings Location in Medical Record   X AMS, Confusion, LOC, etc. -Mentation altered H&P   X Acute / Chronic Illness Hypoglycemia versus acute respiratory failure  H&P    Radiology Findings     X Electrolyte Imbalance Mag= 1.5  Phos= 5.0 LAB 6/26    Medication     X Treatment magnesium sulfate IV     MAR    Other       Provider, please specify the diagnosis or diagnoses associated with above clinical findings.    [  ] Metabolic Encephalopathy    [  ] Other Encephalopathy    [  ] Unspecified Encephalopathy    [  ] Other (please specify): _____________________________________    [  x] Clinically Undetermined      Please document in your progress notes daily for the duration of treatment until resolved, and  include in your discharge summary.       4/13/18

## 2018-10-05 PROBLEM — S82.891A BILATERAL ANKLE FRACTURES, CLOSED, INITIAL ENCOUNTER: Status: ACTIVE | Noted: 2018-10-05

## 2018-10-05 PROBLEM — S82.892A BILATERAL ANKLE FRACTURES, CLOSED, INITIAL ENCOUNTER: Status: ACTIVE | Noted: 2018-10-05

## 2018-10-08 PROBLEM — R50.9 FEVER: Status: ACTIVE | Noted: 2018-10-08

## 2018-10-08 PROBLEM — J96.01 ACUTE HYPOXEMIC RESPIRATORY FAILURE: Status: ACTIVE | Noted: 2018-10-08

## 2018-10-13 PROBLEM — D63.8 ANEMIA OF CHRONIC DISEASE: Status: ACTIVE | Noted: 2018-10-13

## 2018-10-13 PROBLEM — N17.9 AKI (ACUTE KIDNEY INJURY): Status: ACTIVE | Noted: 2018-10-13

## 2018-10-14 PROBLEM — E66.2 OBESITY HYPOVENTILATION SYNDROME: Status: ACTIVE | Noted: 2018-10-14

## 2018-10-15 PROBLEM — R63.8 ALTERATION IN NUTRITION: Status: ACTIVE | Noted: 2018-10-15

## 2018-12-12 PROBLEM — T84.84XA PAIN IN BONE FIXATION DEVICE: Status: ACTIVE | Noted: 2018-12-12

## 2018-12-12 PROBLEM — T84.84XA PAINFUL ORTHOPAEDIC HARDWARE: Status: ACTIVE | Noted: 2018-12-12

## 2019-05-23 PROBLEM — I21.4 NSTEMI (NON-ST ELEVATED MYOCARDIAL INFARCTION): Status: ACTIVE | Noted: 2019-05-23

## 2019-05-24 PROBLEM — N39.0 ACUTE UTI: Status: ACTIVE | Noted: 2019-05-24

## 2019-05-25 PROBLEM — M89.8X9 METABOLIC BONE DISEASE: Status: ACTIVE | Noted: 2019-05-25

## 2019-05-28 PROBLEM — E11.21 DIABETIC KIDNEY DISEASE: Status: ACTIVE | Noted: 2019-05-28

## 2020-01-21 ENCOUNTER — HOSPITAL ENCOUNTER (OUTPATIENT)
Dept: RADIOLOGY | Facility: HOSPITAL | Age: 60
Discharge: HOME | End: 2020-01-21
Attending: PHYSICIAN ASSISTANT
Payer: MEDICAID

## 2020-01-21 ENCOUNTER — OFFICE VISIT (OUTPATIENT)
Dept: ORTHOPEDICS | Facility: CLINIC | Age: 60
End: 2020-01-21
Payer: MEDICAID

## 2020-01-21 VITALS
BODY MASS INDEX: 44.58 KG/M2 | HEIGHT: 66 IN | SYSTOLIC BLOOD PRESSURE: 126 MMHG | RESPIRATION RATE: 16 BRPM | HEART RATE: 78 BPM | DIASTOLIC BLOOD PRESSURE: 88 MMHG

## 2020-01-21 DIAGNOSIS — G89.29 CHRONIC RIGHT SHOULDER PAIN: Primary | ICD-10-CM

## 2020-01-21 DIAGNOSIS — M25.511 CHRONIC RIGHT SHOULDER PAIN: Primary | ICD-10-CM

## 2020-01-21 DIAGNOSIS — M25.511 CHRONIC RIGHT SHOULDER PAIN: ICD-10-CM

## 2020-01-21 DIAGNOSIS — G89.29 CHRONIC RIGHT SHOULDER PAIN: ICD-10-CM

## 2020-01-21 DIAGNOSIS — M54.2 NECK PAIN: ICD-10-CM

## 2020-01-21 PROCEDURE — 99999 PR PBB SHADOW E&M-EST. PATIENT-LVL V: ICD-10-PCS | Mod: PBBFAC,,, | Performed by: PHYSICIAN ASSISTANT

## 2020-01-21 PROCEDURE — 20610 DRAIN/INJ JOINT/BURSA W/O US: CPT | Mod: PBBFAC | Performed by: PHYSICIAN ASSISTANT

## 2020-01-21 PROCEDURE — 72040 XR CERVICAL SPINE 2 OR 3 VIEWS: ICD-10-PCS | Mod: 26,,, | Performed by: RADIOLOGY

## 2020-01-21 PROCEDURE — 99203 OFFICE O/P NEW LOW 30 MIN: CPT | Mod: S$PBB,25,, | Performed by: PHYSICIAN ASSISTANT

## 2020-01-21 PROCEDURE — 20610 PR DRAIN/INJECT LARGE JOINT/BURSA: ICD-10-PCS | Mod: S$PBB,RT,, | Performed by: PHYSICIAN ASSISTANT

## 2020-01-21 PROCEDURE — 73030 X-RAY EXAM OF SHOULDER: CPT | Mod: TC,RT

## 2020-01-21 PROCEDURE — 99999 PR PBB SHADOW E&M-EST. PATIENT-LVL V: CPT | Mod: PBBFAC,,, | Performed by: PHYSICIAN ASSISTANT

## 2020-01-21 PROCEDURE — 73030 X-RAY EXAM OF SHOULDER: CPT | Mod: 26,RT,, | Performed by: RADIOLOGY

## 2020-01-21 PROCEDURE — 72040 X-RAY EXAM NECK SPINE 2-3 VW: CPT | Mod: TC

## 2020-01-21 PROCEDURE — 72040 X-RAY EXAM NECK SPINE 2-3 VW: CPT | Mod: 26,,, | Performed by: RADIOLOGY

## 2020-01-21 PROCEDURE — 73030 XR SHOULDER COMPLETE 2 OR MORE VIEWS RIGHT: ICD-10-PCS | Mod: 26,RT,, | Performed by: RADIOLOGY

## 2020-01-21 PROCEDURE — 99215 OFFICE O/P EST HI 40 MIN: CPT | Mod: PBBFAC,25 | Performed by: PHYSICIAN ASSISTANT

## 2020-01-21 PROCEDURE — 20610 DRAIN/INJ JOINT/BURSA W/O US: CPT | Mod: S$PBB,RT,, | Performed by: PHYSICIAN ASSISTANT

## 2020-01-21 PROCEDURE — 99203 PR OFFICE/OUTPT VISIT, NEW, LEVL III, 30-44 MIN: ICD-10-PCS | Mod: S$PBB,25,, | Performed by: PHYSICIAN ASSISTANT

## 2020-01-21 RX ORDER — ASCORBIC ACID 500 MG
500 TABLET ORAL DAILY
COMMUNITY

## 2020-01-21 RX ORDER — DOXYCYCLINE HYCLATE 100 MG
100 TABLET ORAL 2 TIMES DAILY
COMMUNITY
End: 2020-03-03

## 2020-01-21 RX ORDER — ROPINIROLE 1 MG/1
1 TABLET, FILM COATED ORAL NIGHTLY
COMMUNITY
End: 2020-03-03 | Stop reason: DRUGHIGH

## 2020-01-21 RX ORDER — DIPHENHYDRAMINE HYDROCHLORIDE 12.5 MG/1
12.5 BAR, CHEWABLE ORAL 3 TIMES DAILY PRN
COMMUNITY
End: 2020-03-03

## 2020-01-21 RX ORDER — LIDOCAINE 50 MG/G
1 PATCH TOPICAL
COMMUNITY
End: 2021-05-31

## 2020-01-21 RX ORDER — TRIAMCINOLONE ACETONIDE 40 MG/ML
40 INJECTION, SUSPENSION INTRA-ARTICULAR; INTRAMUSCULAR ONCE
Status: COMPLETED | OUTPATIENT
Start: 2020-01-21 | End: 2020-01-21

## 2020-01-21 RX ORDER — LANOLIN ALCOHOL/MO/W.PET/CERES
1000 CREAM (GRAM) TOPICAL DAILY
COMMUNITY
End: 2021-02-09

## 2020-01-21 RX ORDER — INSULIN ASPART 100 [IU]/ML
14 INJECTION, SOLUTION INTRAVENOUS; SUBCUTANEOUS
COMMUNITY
End: 2020-03-03 | Stop reason: SDUPTHER

## 2020-01-21 RX ORDER — BUPIVACAINE HYDROCHLORIDE 2.5 MG/ML
3 INJECTION, SOLUTION INFILTRATION; PERINEURAL ONCE
Status: COMPLETED | OUTPATIENT
Start: 2020-01-21 | End: 2020-01-21

## 2020-01-21 RX ORDER — ROPINIROLE 2 MG/1
2 TABLET, FILM COATED ORAL NIGHTLY
COMMUNITY
End: 2021-03-31 | Stop reason: DRUGHIGH

## 2020-01-21 RX ORDER — BENZONATATE 200 MG/1
200 CAPSULE ORAL 3 TIMES DAILY PRN
COMMUNITY
End: 2020-03-03

## 2020-01-21 RX ORDER — ESCITALOPRAM OXALATE 10 MG/1
10 TABLET ORAL DAILY
COMMUNITY
End: 2021-03-31 | Stop reason: DRUGHIGH

## 2020-01-21 RX ORDER — FUROSEMIDE 40 MG/1
40 TABLET ORAL DAILY
COMMUNITY
End: 2020-03-03

## 2020-01-21 RX ORDER — MELATONIN 5 MG
5 CAPSULE ORAL NIGHTLY PRN
COMMUNITY
End: 2021-03-31 | Stop reason: DRUGHIGH

## 2020-01-21 RX ADMIN — BUPIVACAINE HYDROCHLORIDE 7.5 MG: 2.5 INJECTION, SOLUTION INFILTRATION; PERINEURAL at 10:01

## 2020-01-21 RX ADMIN — TRIAMCINOLONE ACETONIDE 40 MG: 40 INJECTION, SUSPENSION INTRA-ARTICULAR; INTRAMUSCULAR at 10:01

## 2020-01-21 NOTE — PROGRESS NOTES
Subjective:      Patient ID: Michelle Godfrey is a 59 y.o. female.    Chief Complaint: Shoulder Injury (Referral from Jacy Garvey for Right shoulder pain)    60 yo F presents to clinic with c/o right shoulder pain for over a year, worse in past few months.  Resident of [facility].  States that she did fall and injure both legs about a year ago but did not recall any injury to shoulder.  Also c/o neck pain that radiates down back of arm and into fingers at times, states that she previously had MRI that showed bulging discs.  Shoulder pain is sharp/throbbing and worse with movement, overhead activities, and reaching behind her back.  Better with rest.  She has also been completing physical therapy and applying biofreeze which both seem to help some.  She also takes Norco as prescribed by PCP.      Review of Systems   Constitution: Negative for chills, diaphoresis and fever.   HENT: Negative for congestion, ear discharge and ear pain.    Eyes: Negative for blurred vision, discharge, double vision and pain.   Cardiovascular: Negative for chest pain, claudication and cyanosis.   Respiratory: Negative for cough, hemoptysis and shortness of breath.    Endocrine: Negative for cold intolerance and heat intolerance.   Skin: Negative for color change, dry skin, itching and rash.   Musculoskeletal: Positive for arthritis, back pain, falls, joint pain, muscle weakness and neck pain. Negative for gout and joint swelling.   Gastrointestinal: Negative for abdominal pain and change in bowel habit.   Neurological: Negative for brief paralysis, disturbances in coordination and dizziness.   Psychiatric/Behavioral: Negative for altered mental status and depression.         Objective:            General    Constitutional: She is oriented to person, place, and time. She appears well-developed and well-nourished. No distress.   HENT:   Head: Atraumatic.   Eyes: EOM are normal. Right eye exhibits no discharge. Left eye  exhibits no discharge.   Cardiovascular: Normal rate.    Pulmonary/Chest: Effort normal. No respiratory distress.   Abdominal: Soft.   Neurological: She is alert and oriented to person, place, and time.   Psychiatric: She has a normal mood and affect. Her behavior is normal.         Back (L-Spine & T-Spine) / Neck (C-Spine) Exam     Neck (C-Spine) Range of Motion   Flexion:     Mild  Extension: Mild    Comments:  Presents to clinic in wheelchair, per pt due to pain secondary to bilateral leg fractures last year.  No tenderness to neck.  Right Shoulder Exam     Inspection/Observation   Swelling: absent  Bruising: absent  Scars: absent  Deformity: absent  Scapular Winging: absent    Tenderness   The patient is tender to palpation of the biceps tendon, greater tuberosity and supraspinatus.    Range of Motion   Active abduction: 140   Passive abduction: 150   Extension: 20   Forward Flexion: 120   External Rotation 0 degrees: 30   Internal rotation 0 degrees: Sacrum     Tests & Signs   Kunz test: positive  Impingement: positive  Lift Off Sign: positive  Belly Press: positive  Active Compression Test (Perry's Sign): positive  Speed's Test: positive    Other   Sensation: normal    Left Shoulder Exam     Inspection/Observation   Swelling: absent  Bruising: absent  Scars: absent  Deformity: absent  Scapular Winging: absent    Range of Motion   Active abduction: 160   Extension: 30   Forward Flexion: 150   External Rotation 0 degrees: 40   Internal rotation 0 degrees: Mid thoracic     Tests & Signs   Active Compression test (Perry's Sign): negative  Speed's Test: negative    Other   Sensation: normal       Muscle Strength   Right Upper Extremity   Shoulder Abduction: 4/5   Shoulder Internal Rotation: 4/5   Shoulder External Rotation: 4/5   Supraspinatus: 4/5/5   Subscapularis: 4/5/5   Biceps: 4/5/5   Left Upper Extremity  Shoulder Abduction: 5/5   Shoulder Internal Rotation: 5/5   Shoulder External Rotation: 5/5    Supraspinatus: 5/5/5   Subscapularis: 5/5/5   Biceps: 5/5/5     Vascular Exam     Right Pulses      Radial:                    2+      Left Pulses      Radial:                    2+            Xray Right Shoulder 1/21/20:  Degenerative changes of the AC joint.    There is no evidence of fracture, dislocation or other acute osseous abnormality. No focal soft tissue abnormality.        Xray Cervical Spine 1/21/20:  Extensive multilevel cervical spondylosis as noted above from C4 through C6.    No plain film evidence of fracture or acute subluxation.      Assessment:       Encounter Diagnoses   Name Primary?    Neck pain     Chronic right shoulder pain Yes          Plan:       We have discussed a variety of treatment options including medications, injections, physical therapy and other alternative treatments. I also explained the indications, risks and benefits of surgery. Patient chooses to proceed with CSI and physical therapy at this time.    I made the decision to obtain old records of the patient including previous notes and imaging. New imaging was ordered today of the extremity or extremities evaluated. I independently reviewed and interpreted the radiographs  today.      1. Injection Procedure  A time out was performed, including verification of patient ID, procedure, site and side, availability of information and equipment, review of safety issues, and agreement with consent, the procedure site was marked.    After time out was performed, the patient was prepped aseptically with chloraprep swabstick. A diagnostic and therapeutic injection of 1:3cc Kenalog/Marcaine was given under sterile technique using a 22g x 1.5 needle from the Posterior  aspect of the right Subacromial in the sitting position.      Michelle Godfrey had no adverse reactions to the medication. Pain decreased. She was instructed to apply ice to the joint for 20 minutes and avoid strenuous activities for 24-36 hours following the injection.  She was warned of possible blood sugar and/or blood pressure changes during that time. Following that time, she can resume regular activities.    She was reminded to call the clinic immediately for any adverse side effects as explained in clinic today.    2. Ice compress to the affected area 2-3x a day for 15-20 minutes as needed for pain management.  3. Continue pain medication as needed as prescribed by PCP.  4. Ambulatory referral to physical therapy for periscapular/rotator cuff strengthening. She will complete this at [facility]. Will also include neck exercises during PT.  5. If neck pain persists, pt should follow up with back and spine physician.  6. RTC in 6 weeks for follow-up, sooner if needed.    Patient voices understanding of and agreement with treatment plan. All of the patient's questions were answered and the patient will contact us if she has any questions or concerns in the interim.

## 2020-03-03 ENCOUNTER — OFFICE VISIT (OUTPATIENT)
Dept: ORTHOPEDICS | Facility: CLINIC | Age: 60
End: 2020-03-03
Payer: MEDICAID

## 2020-03-03 VITALS
WEIGHT: 289 LBS | HEART RATE: 76 BPM | SYSTOLIC BLOOD PRESSURE: 144 MMHG | DIASTOLIC BLOOD PRESSURE: 88 MMHG | BODY MASS INDEX: 46.45 KG/M2 | RESPIRATION RATE: 18 BRPM | HEIGHT: 66 IN

## 2020-03-03 DIAGNOSIS — G89.29 CHRONIC RIGHT SHOULDER PAIN: Primary | ICD-10-CM

## 2020-03-03 DIAGNOSIS — M25.511 CHRONIC RIGHT SHOULDER PAIN: Primary | ICD-10-CM

## 2020-03-03 PROCEDURE — 99215 OFFICE O/P EST HI 40 MIN: CPT | Mod: PBBFAC | Performed by: PHYSICIAN ASSISTANT

## 2020-03-03 PROCEDURE — 99999 PR PBB SHADOW E&M-EST. PATIENT-LVL V: CPT | Mod: PBBFAC,,, | Performed by: PHYSICIAN ASSISTANT

## 2020-03-03 PROCEDURE — 99999 PR PBB SHADOW E&M-EST. PATIENT-LVL V: ICD-10-PCS | Mod: PBBFAC,,, | Performed by: PHYSICIAN ASSISTANT

## 2020-03-03 PROCEDURE — 99213 PR OFFICE/OUTPT VISIT, EST, LEVL III, 20-29 MIN: ICD-10-PCS | Mod: S$PBB,,, | Performed by: PHYSICIAN ASSISTANT

## 2020-03-03 PROCEDURE — 99213 OFFICE O/P EST LOW 20 MIN: CPT | Mod: S$PBB,,, | Performed by: PHYSICIAN ASSISTANT

## 2020-03-03 RX ORDER — BENZONATATE 100 MG/1
200 CAPSULE ORAL 3 TIMES DAILY PRN
COMMUNITY

## 2020-03-03 RX ORDER — INSULIN ASPART 100 [IU]/ML
INJECTION, SOLUTION INTRAVENOUS; SUBCUTANEOUS
COMMUNITY
End: 2021-03-31

## 2020-03-03 RX ORDER — ASPIRIN 81 MG/1
TABLET ORAL
COMMUNITY
End: 2021-02-09

## 2020-03-03 RX ORDER — HYDROCODONE BITARTRATE AND ACETAMINOPHEN 10; 325 MG/1; MG/1
TABLET ORAL
COMMUNITY
End: 2020-03-03

## 2020-03-03 RX ORDER — BUDESONIDE AND FORMOTEROL FUMARATE DIHYDRATE 160; 4.5 UG/1; UG/1
2 AEROSOL RESPIRATORY (INHALATION) EVERY 12 HOURS
COMMUNITY

## 2020-03-03 RX ORDER — FUROSEMIDE 40 MG/1
TABLET ORAL
COMMUNITY
End: 2021-02-09

## 2020-03-03 RX ORDER — CETIRIZINE HYDROCHLORIDE 10 MG/1
TABLET ORAL
COMMUNITY
End: 2021-03-31

## 2020-03-03 NOTE — PROGRESS NOTES
Subjective:      Patient ID: Michelle Godfrey is a 59 y.o. female.    Chief Complaint: Follow-up (6 wk)    Interval:  Patient presents to clinic for follow up of right shoulder pain.  States that injection that she received at last injection provided good relief for few weeks.  She did not complete physical therapy as ordered at last visit, states that physical therapy is available at nursing home.  Would like to try therapy as we previously discussed because she is not interested in surgery.    HPI 1/21/20:  60 yo F presents to clinic with c/o right shoulder pain for over a year, worse in past few months.  Resident of [facility].  States that she did fall and injure both legs about a year ago but did not recall any injury to shoulder.  Also c/o neck pain that radiates down back of arm and into fingers at times, states that she previously had MRI that showed bulging discs.  Shoulder pain is sharp/throbbing and worse with movement, overhead activities, and reaching behind her back.  Better with rest.  She has also been completing physical therapy and applying biofreeze which both seem to help some.  She also takes Norco as prescribed by PCP.    Follow-up   Associated symptoms include neck pain. Pertinent negatives include no abdominal pain, change in bowel habit, chest pain, chills, congestion, coughing, diaphoresis, fever, joint swelling or rash.       Review of Systems   Constitution: Negative for chills, diaphoresis and fever.   HENT: Negative for congestion, ear discharge and ear pain.    Eyes: Negative for blurred vision, discharge, double vision and pain.   Cardiovascular: Negative for chest pain, claudication and cyanosis.   Respiratory: Negative for cough, hemoptysis and shortness of breath.    Endocrine: Negative for cold intolerance and heat intolerance.   Skin: Negative for color change, dry skin, itching and rash.   Musculoskeletal: Positive for arthritis, back pain, falls, joint pain,  muscle weakness and neck pain. Negative for gout and joint swelling.   Gastrointestinal: Negative for abdominal pain and change in bowel habit.   Neurological: Negative for brief paralysis, disturbances in coordination and dizziness.   Psychiatric/Behavioral: Negative for altered mental status and depression.         Objective:            General    Constitutional: She is oriented to person, place, and time. She appears well-developed and well-nourished. No distress.   HENT:   Head: Atraumatic.   Eyes: EOM are normal. Right eye exhibits no discharge. Left eye exhibits no discharge.   Cardiovascular: Normal rate.    Pulmonary/Chest: Effort normal. No respiratory distress.   Abdominal: Soft.   Neurological: She is alert and oriented to person, place, and time.   Psychiatric: She has a normal mood and affect. Her behavior is normal.         Back (L-Spine & T-Spine) / Neck (C-Spine) Exam     Neck (C-Spine) Range of Motion   Flexion:     Mild  Extension: Mild    Comments:  Presents to clinic in wheelchair, per pt due to pain secondary to bilateral leg fractures last year.  No tenderness to neck.  Right Shoulder Exam     Inspection/Observation   Swelling: absent  Bruising: absent  Scars: absent  Deformity: absent  Scapular Winging: absent    Tenderness   The patient is tender to palpation of the biceps tendon, greater tuberosity and supraspinatus.    Range of Motion   Active abduction: 140   Passive abduction: 150   Extension: 20   Forward Flexion: 120   External Rotation 0 degrees: 30   Internal rotation 0 degrees: Sacrum     Tests & Signs   Kunz test: positive  Impingement: positive  Lift Off Sign: positive  Belly Press: positive  Active Compression Test (Deschutes's Sign): positive  Speed's Test: positive    Other   Sensation: normal    Left Shoulder Exam     Inspection/Observation   Swelling: absent  Bruising: absent  Scars: absent  Deformity: absent  Scapular Winging: absent    Range of Motion   Active abduction:  160   Extension: 30   Forward Flexion: 150   External Rotation 0 degrees: 40   Internal rotation 0 degrees: Mid thoracic     Tests & Signs   Active Compression test (Elbert's Sign): negative  Speed's Test: negative    Other   Sensation: normal       Muscle Strength   Right Upper Extremity   Shoulder Abduction: 4/5   Shoulder Internal Rotation: 4/5   Shoulder External Rotation: 4/5   Supraspinatus: 4/5/5   Subscapularis: 4/5/5   Biceps: 4/5/5   Left Upper Extremity  Shoulder Abduction: 5/5   Shoulder Internal Rotation: 5/5   Shoulder External Rotation: 5/5   Supraspinatus: 5/5/5   Subscapularis: 5/5/5   Biceps: 5/5/5     Vascular Exam     Right Pulses      Radial:                    2+      Left Pulses      Radial:                    2+            Xray Right Shoulder 1/21/20:  Degenerative changes of the AC joint.    There is no evidence of fracture, dislocation or other acute osseous abnormality. No focal soft tissue abnormality.        Xray Cervical Spine 1/21/20:  Extensive multilevel cervical spondylosis as noted above from C4 through C6.    No plain film evidence of fracture or acute subluxation.      Assessment:       No diagnosis found.       Plan:       We have discussed a variety of treatment options including medications, injections, physical therapy and other alternative treatments. I also explained the indications, risks and benefits of surgery. Patient chooses to proceed physical therapy at this time. She would like to repeat steroid injection when appropriate because it did provide her with good relief for a few weeks.    I made the decision to obtain old records of the patient including previous notes and imaging.I independently reviewed and interpreted the radiographs  today.        1. Ice compress to the affected area 2-3x a day for 15-20 minutes as needed for pain management.  2. Continue pain medication as needed as prescribed by PCP. Contact PCP if dosing adjustment is needed.  4. Ambulatory  referral to physical therapy for periscapular/rotator cuff strengthening. She will complete this at [facility]. Will also include neck exercises during PT. -- This was ordered at last visit and not completed. Called nursing home, per Laura orders just need to be written on consult sheet and returned to nursing home as was done at last visit. Placed order for PT again today.  5. If neck pain persists, pt should follow up with back and spine physician.  6. RTC in 2 months for follow-up, sooner if needed.    Patient voices understanding of and agreement with treatment plan. All of the patient's questions were answered and the patient will contact us if she has any questions or concerns in the interim.

## 2020-12-12 NOTE — PLAN OF CARE
04/24/18 1008   Discharge Assessment   Assessment Type Discharge Planning Assessment   Confirmed/corrected address and phone number on facesheet? Yes   Assessment information obtained from? Patient;Medical Record   Expected Length of Stay (days) 2   Communicated expected length of stay with patient/caregiver yes   Prior to hospitilization cognitive status: Alert/Oriented   Prior to hospitalization functional status: Independent   Current cognitive status: Alert/Oriented   Current Functional Status: Independent   Lives With facility resident  (The pt is a resident at [facility]. )   Able to Return to Prior Arrangements yes   Is patient able to care for self after discharge? Yes   Who are your caregiver(s) and their phone number(s)? Joslyn Estevezrowan [phone]   Patient's perception of discharge disposition home or selfcare   Readmission Within The Last 30 Days no previous admission in last 30 days   Patient currently being followed by outpatient case management? No   Patient currently receives any other outside agency services? No   Equipment Currently Used at Home none   Do you have any problems affording any of your prescribed medications? No   Is the patient taking medications as prescribed? yes   Does the patient have transportation home? Yes   Transportation Available agency transportation   Does the patient receive services at the [facility]? No   Discharge Plan A Return to nursing home   Discharge Plan B Return to Nursing Home   Patient/Family In Agreement With Plan yes     The pt is a 57 year old female who was admitted with dehydration. The pt is a resident at [facility]. The pt does not use an assistive device to ambulate. The pt does not use O2. The pt does not have hh. The pt is able to afford her medications. The pt has no other SW needs noted at this time. SW will continue to follow and offer support as needed.    Tia Lama LMSW       "Emergency Department SIGN OUT NOTE     Patient: Vee Sherman Age: 34year old Sex: female   MRN: [id number] : 1982 Encounter Date: 2020       _x_ This patient's care has been transferred to the primary team, however is currently boarding in the ED due to bed constraints. Unless otherwise noted, I have not evaluated the patient personally or had any active involvement in the patient's care. (May place check jatin if appropriate)      Sign out from:     Dispo: admitted to [facility]: COVID, CP r/o ACS; ED w/u negative    History & Course: Lupus, asthma presented with CP    Pending items: NTD      Diagnosis:   1. Chest pain, unspecified type    2. Acute bilateral back pain, unspecified back location    3. COVID-19 virus infection    4. Abdominal pain, LUQ (left upper quadrant)    5. Cough      Visit Vitals  /77   Pulse 77   Temp 98.2 Â°F (36.8 Â°C) (Tympanic)   Resp 16   Ht 5' 5"" (1.651 m)   Wt 70.8 kg (156 lb)   SpO2 99%   BMI 25.96 kg/mÂ²       Labs:     Results for orders placed or performed during the hospital encounter of 20   Comprehensive Metabolic Panel   Result Value    Fasting Status     Sodium 136    Potassium 4.5    Chloride 107    Carbon Dioxide 28    Anion Gap 6 (L)    Glucose 86    BUN 7    Creatinine 0.73    Glomerular Filtration Rate >90     Comment: eGFR results = or >90 mL/min/1.73m2 = Normal kidney function.     BUN/ Creatinine Ratio 10    Calcium 9.2    Bilirubin, Total 0.3    GOT/AST 16    GPT/ALT 14    Alkaline Phosphatase 73    Albumin 4.5    Protein, Total 8.5 (H)    Globulin 4.0    A/G Ratio 1.1   Troponin I Ultra Sensitive   Result Value    Troponin I, Ultra Sensitive <0.02   CBC with Automated Differential (performable only)   Result Value    WBC 3.1 (L)    RBC 4.87    HGB 13.6    HCT 42.3    MCV 86.9    MCH 27.9    MCHC 32.2    RDW-CV 14.3        NRBC 0    Neutrophil, Percent 49    Lymphocytes, Percent 38    Mono, Percent 13    Eosinophils, Percent 0    Basophils, " Percent 0    Immature Granulocytes 0    Absolute Neutrophils 1.5 (L)    Absolute Lymphocytes 1.2    Absolute Monocytes 0.4    Absolute Eosinophils  0.0    Absolute Basophils 0.0    Absolute Immmature Granulocytes 0.0    RDW-SD 45.4   Troponin I Ultra Sensitive   Result Value    Troponin I, Ultra Sensitive <0.02   D Dimer, Quantitative   Result Value    D Dimer, Quantitative <0.19   Electrocardiogram 12-Lead   Result Value    Ventricular Rate EKG/Min (BPM) 74    Atrial Rate (BPM) 74    MN-Interval (MSEC) 156    QRS-Interval (MSEC) 94    QT-Interval (MSEC) 420    QTc 466    P Axis (Degrees) 33    R Axis (Degrees) 50    T Axis (Degrees) 34    REPORT TEXT      Normal sinus rhythm  Normal ECG  No previous ECGs available  Confirmed by KEITH Rice MD ([id number]) on 12/11/2020 9:32:57 PM     HCG POC   Result Value    HCG, URINE - POINT OF CARE Negative       Imaging:   CTA CHEST ABDOMEN PELVIS W CONTRAST   Final Result   No acute findings.          =========================   PROCEDURE INFORMATION:    Exam: CT Angiography Abdomen and Pelvis With Contrast    Exam date and time: 12/12/2020 5:38 AM    Age: 34years old    Clinical indication: Dorsalgia, unspecified; Cough; Chest pain, unspecified; Left upper quadrant pain; Additional info: R/O aortic dissection       TECHNIQUE:    Imaging protocol: Computed tomographic angiography of the abdomen and pelvis with intravenous contrast material.    3D rendering (Not supervised by radiologist): MIP and/or 3D reconstructed images were created by the technologist.    Radiation optimization: All CT scans at this facility use at least one of these dose optimization techniques: automated exposure control; mA and/or kV adjustment per patient size (includes targeted exams where dose is matched to clinical indication); or    iterative reconstruction. Contrast material: OMNI 350;  Contrast volume: 75 ml; Contrast route: INTRAVENOUS (IV);        COMPARISON:    DX XR CHEST PA AND LATERAL 2 VIEWS 12/11/2020 9:29 PM       FINDINGS:    Aorta: No aortic aneurysm. No aortic dissection. Celiac trunk and mesenteric arteries: No occlusion or significant stenosis. Renal arteries: No occlusion or significant stenosis. Right iliac arteries: No occlusion or significant stenosis. Left iliac arteries: No occlusion or significant stenosis. Liver: No mass. Gallbladder and bile ducts: Unremarkable. No calcified stones. No ductal dilation. Pancreas: Unremarkable. No mass. No ductal dilation. Spleen: Unremarkable. No splenomegaly. Adrenals: Unremarkable. No mass. Kidneys and ureters: Unremarkable. No solid mass. No hydronephrosis. Stomach and bowel: Unremarkable. No obstruction. No mucosal thickening. Appendix: No evidence of appendicitis. Intraperitoneal space: Unremarkable. No free air. No significant fluid collection. Lymph nodes: Unremarkable. No enlarged lymph nodes. Urinary bladder: Unremarkable. No mass. Reproductive: IUD is well positioned in the endometrial cavity of the uterus. The    Bones/joints: No acute fracture. No dislocation. Soft tissues: Unremarkable. IMPRESSION:    No acute abnormality. Electronically Signed by: Darek Bradford M.D. Signed on: 12/12/2020 06:28 AM      CT HEAD WO CONTRAST   Final Result   Normal examination of brain. There is no acute intracranial abnormality. There is no structural abnormality. Electronically Signed by: Lynn Florian M.D. Signed on: 12/12/2020 06:24 AM      XR CHEST PA AND LATERAL 2 VIEWS   Final Result      No evidence of active intrathoracic disease. Electronically Signed by: Magda Bruce MD    Signed on: 12/11/2020 9:13 PM              ED Course as of Dec 12 0632   Fri Dec 11, 2020   2017 I have participated in triage to expedite patient care. I have not performed a complete history and physical examination nor definitive care, unless otherwise stated. [WJ]      ED Course User Index  [WJ] Sommer Carlson, DO       This note may have been created using voice dictation technology and may include inadvertent errors.       Modesta Alvarado, [address], [place]  12/12/20 [id number]

## 2021-01-14 NOTE — ASSESSMENT & PLAN NOTE
CT of head normal.  Hematoma - okay to apply ice as needed     Quality 130: Documentation Of Current Medications In The Medical Record: Current Medications Documented Detail Level: Detailed Quality 111:Pneumonia Vaccination Status For Older Adults: Pneumococcal Vaccination Previously Received

## 2021-02-12 PROBLEM — H33.22 LEFT RETINAL DETACHMENT: Status: ACTIVE | Noted: 2021-02-12

## 2021-04-01 PROBLEM — E66.01 MORBID OBESITY: Status: ACTIVE | Noted: 2018-10-14

## 2021-05-20 PROBLEM — N30.00 ACUTE CYSTITIS WITHOUT HEMATURIA: Status: ACTIVE | Noted: 2021-05-20

## 2021-05-21 PROBLEM — R10.9 ABDOMINAL PAIN: Status: ACTIVE | Noted: 2021-05-21

## 2021-05-21 PROBLEM — E11.9 DM TYPE 2 (DIABETES MELLITUS, TYPE 2): Status: ACTIVE | Noted: 2021-05-21

## 2021-06-11 PROBLEM — H25.012 CATARACT CORTICAL, SENILE, LEFT: Status: ACTIVE | Noted: 2021-06-11

## 2021-07-30 PROBLEM — Z98.890 POST-OPERATIVE STATE: Status: ACTIVE | Noted: 2021-07-30

## 2022-02-18 PROBLEM — D72.829 LEUKOCYTOSIS: Status: ACTIVE | Noted: 2022-02-18

## 2022-02-18 PROBLEM — E83.42 HYPOMAGNESEMIA: Status: ACTIVE | Noted: 2022-02-18

## 2022-02-18 PROBLEM — Z86.73 HISTORY OF CVA (CEREBROVASCULAR ACCIDENT): Status: ACTIVE | Noted: 2022-02-18

## 2022-02-18 PROBLEM — G47.33 OSA ON CPAP: Status: ACTIVE | Noted: 2022-02-18

## 2022-02-21 PROBLEM — D72.829 LEUKOCYTOSIS: Status: RESOLVED | Noted: 2022-02-18 | Resolved: 2022-02-21

## 2022-02-21 PROBLEM — N39.0 ACUTE UTI: Status: RESOLVED | Noted: 2019-05-24 | Resolved: 2022-02-21

## 2022-02-21 PROBLEM — E83.42 HYPOMAGNESEMIA: Status: RESOLVED | Noted: 2022-02-18 | Resolved: 2022-02-21

## 2022-03-03 PROBLEM — E78.5 HLD (HYPERLIPIDEMIA): Status: ACTIVE | Noted: 2022-03-03

## 2022-03-03 PROBLEM — Z86.79 HX OF ATRIAL FLUTTER: Status: ACTIVE | Noted: 2022-03-03

## 2022-03-14 PROBLEM — N17.9 AKI (ACUTE KIDNEY INJURY): Status: RESOLVED | Noted: 2018-10-13 | Resolved: 2022-03-14

## 2022-03-14 PROBLEM — D72.829 LEUKOCYTOSIS: Status: RESOLVED | Noted: 2022-02-18 | Resolved: 2022-03-14

## 2022-03-14 PROBLEM — N39.0 COMPLICATED UTI (URINARY TRACT INFECTION): Status: RESOLVED | Noted: 2019-05-24 | Resolved: 2022-03-14

## 2022-04-03 PROBLEM — N39.0 UTI (URINARY TRACT INFECTION): Status: RESOLVED | Noted: 2019-05-24 | Resolved: 2022-04-03

## 2022-04-03 PROBLEM — N17.9 AKI (ACUTE KIDNEY INJURY): Status: RESOLVED | Noted: 2018-10-13 | Resolved: 2022-04-03

## 2022-04-03 PROBLEM — D72.829 LEUKOCYTOSIS: Status: RESOLVED | Noted: 2022-02-18 | Resolved: 2022-04-03

## 2022-11-02 ENCOUNTER — CLINICAL SUPPORT (OUTPATIENT)
Dept: AUDIOLOGY | Facility: CLINIC | Age: 62
End: 2022-11-02
Payer: MEDICAID

## 2022-11-02 ENCOUNTER — OFFICE VISIT (OUTPATIENT)
Dept: OTOLARYNGOLOGY | Facility: CLINIC | Age: 62
End: 2022-11-02
Payer: MEDICAID

## 2022-11-02 VITALS — HEART RATE: 79 BPM | DIASTOLIC BLOOD PRESSURE: 76 MMHG | SYSTOLIC BLOOD PRESSURE: 122 MMHG

## 2022-11-02 DIAGNOSIS — H90.3 SENSORINEURAL HEARING LOSS (SNHL) OF BOTH EARS: Primary | ICD-10-CM

## 2022-11-02 DIAGNOSIS — H90.3 SNHL (SENSORY-NEURAL HEARING LOSS), ASYMMETRICAL: ICD-10-CM

## 2022-11-02 DIAGNOSIS — H90.A21 SENSORINEURAL HEARING LOSS (SNHL) OF RIGHT EAR WITH RESTRICTED HEARING OF LEFT EAR: Primary | ICD-10-CM

## 2022-11-02 DIAGNOSIS — H93.13 TINNITUS, BILATERAL: ICD-10-CM

## 2022-11-02 PROCEDURE — 99203 PR OFFICE/OUTPT VISIT, NEW, LEVL III, 30-44 MIN: ICD-10-PCS | Mod: S$PBB,,, | Performed by: NURSE PRACTITIONER

## 2022-11-02 PROCEDURE — 92567 TYMPANOMETRY: CPT | Mod: PBBFAC | Performed by: AUDIOLOGIST

## 2022-11-02 PROCEDURE — 99999 PR PBB SHADOW E&M-EST. PATIENT-LVL III: CPT | Mod: PBBFAC,,, | Performed by: NURSE PRACTITIONER

## 2022-11-02 PROCEDURE — 99213 OFFICE O/P EST LOW 20 MIN: CPT | Mod: PBBFAC | Performed by: NURSE PRACTITIONER

## 2022-11-02 PROCEDURE — 99203 OFFICE O/P NEW LOW 30 MIN: CPT | Mod: S$PBB,,, | Performed by: NURSE PRACTITIONER

## 2022-11-02 PROCEDURE — 3051F PR MOST RECENT HEMOGLOBIN A1C LEVEL 7.0 - < 8.0%: ICD-10-PCS | Mod: CPTII,,, | Performed by: NURSE PRACTITIONER

## 2022-11-02 PROCEDURE — 1159F MED LIST DOCD IN RCRD: CPT | Mod: CPTII,,, | Performed by: NURSE PRACTITIONER

## 2022-11-02 PROCEDURE — 3078F DIAST BP <80 MM HG: CPT | Mod: CPTII,,, | Performed by: NURSE PRACTITIONER

## 2022-11-02 PROCEDURE — 3074F SYST BP LT 130 MM HG: CPT | Mod: CPTII,,, | Performed by: NURSE PRACTITIONER

## 2022-11-02 PROCEDURE — 1159F PR MEDICATION LIST DOCUMENTED IN MEDICAL RECORD: ICD-10-PCS | Mod: CPTII,,, | Performed by: NURSE PRACTITIONER

## 2022-11-02 PROCEDURE — 3051F HG A1C>EQUAL 7.0%<8.0%: CPT | Mod: CPTII,,, | Performed by: NURSE PRACTITIONER

## 2022-11-02 PROCEDURE — 3074F PR MOST RECENT SYSTOLIC BLOOD PRESSURE < 130 MM HG: ICD-10-PCS | Mod: CPTII,,, | Performed by: NURSE PRACTITIONER

## 2022-11-02 PROCEDURE — 92557 COMPREHENSIVE HEARING TEST: CPT | Mod: PBBFAC | Performed by: AUDIOLOGIST

## 2022-11-02 PROCEDURE — 99999 PR PBB SHADOW E&M-EST. PATIENT-LVL III: ICD-10-PCS | Mod: PBBFAC,,, | Performed by: NURSE PRACTITIONER

## 2022-11-02 PROCEDURE — 3078F PR MOST RECENT DIASTOLIC BLOOD PRESSURE < 80 MM HG: ICD-10-PCS | Mod: CPTII,,, | Performed by: NURSE PRACTITIONER

## 2022-11-02 NOTE — PROGRESS NOTES
Audiologic Evaluation 11/2/2022:       Michelle Godfrey, a 62 y.o. female, was seen today in the clinic for an audiologic evaluation.  Ms. Godfrey reported difficulty hearing from her right ear for the last 5 years. Ms. Godfrey reported that she has noticed she is starting to have more trouble hearing from the left ear recently. Ms. Godfrey reported occasional bilateral tinnitus. She denied otalgia and true vertigo.     Tympanometry revealed Type A tympanogram in the right ear and Type A tympanogram in the left ear. Audiogram results revealed moderate rising to mild and sloping to profound sensorineural hearing loss in the right ear and normal sloping to moderately severe high frequency sensorineural hearing loss in the left ear.  Speech reception thresholds were noted at 40 dB in the right ear and 25 dB in the left ear.  Speech discrimination scores were 76% in the right ear and 92% in the left ear.    Recommendations:  Otologic evaluation  Hearing aid consultation with medical clearance  Annual audiogram  Hearing protection when in noise
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
## 2023-03-29 ENCOUNTER — OFFICE VISIT (OUTPATIENT)
Dept: OTOLARYNGOLOGY | Facility: CLINIC | Age: 63
End: 2023-03-29
Payer: MEDICAID

## 2023-03-29 DIAGNOSIS — R49.0 DYSPHONIA: Primary | ICD-10-CM

## 2023-03-29 PROCEDURE — 99214 PR OFFICE/OUTPT VISIT, EST, LEVL IV, 30-39 MIN: ICD-10-PCS | Mod: S$PBB,25,, | Performed by: NURSE PRACTITIONER

## 2023-03-29 PROCEDURE — 31575 PR LARYNGOSCOPY, FLEXIBLE; DIAGNOSTIC: ICD-10-PCS | Mod: S$PBB,,, | Performed by: NURSE PRACTITIONER

## 2023-03-29 PROCEDURE — 99999 PR PBB SHADOW E&M-EST. PATIENT-LVL II: ICD-10-PCS | Mod: PBBFAC,,, | Performed by: NURSE PRACTITIONER

## 2023-03-29 PROCEDURE — 3051F PR MOST RECENT HEMOGLOBIN A1C LEVEL 7.0 - < 8.0%: ICD-10-PCS | Mod: CPTII,,, | Performed by: NURSE PRACTITIONER

## 2023-03-29 PROCEDURE — 99214 OFFICE O/P EST MOD 30 MIN: CPT | Mod: S$PBB,25,, | Performed by: NURSE PRACTITIONER

## 2023-03-29 PROCEDURE — 31575 DIAGNOSTIC LARYNGOSCOPY: CPT | Mod: S$PBB,,, | Performed by: NURSE PRACTITIONER

## 2023-03-29 PROCEDURE — 99999 PR PBB SHADOW E&M-EST. PATIENT-LVL II: CPT | Mod: PBBFAC,,, | Performed by: NURSE PRACTITIONER

## 2023-03-29 PROCEDURE — 3051F HG A1C>EQUAL 7.0%<8.0%: CPT | Mod: CPTII,,, | Performed by: NURSE PRACTITIONER

## 2023-03-29 PROCEDURE — 99212 OFFICE O/P EST SF 10 MIN: CPT | Mod: PBBFAC | Performed by: NURSE PRACTITIONER

## 2023-03-29 PROCEDURE — 31575 DIAGNOSTIC LARYNGOSCOPY: CPT | Mod: PBBFAC | Performed by: NURSE PRACTITIONER

## 2023-03-29 NOTE — ASSESSMENT & PLAN NOTE
Exam normal. Discussed possible causes including functional dysphonia. Referral for voice therapy placed. GI referral placed as well for GERD. Questions answered. RTC prn.
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
## 2023-03-29 NOTE — PATIENT INSTRUCTIONS
ACID REFLUX   What is acid reflux?    When we eat, food passes from the throat and into the stomach through a tube called the esophagus. At the bottom of the esophagus is a ring of muscles that acts as a valve between the esophagus and stomach, called the lower esophageal sphincter. Smoking, alcohol, and certain types of food may weaken the sphincter, so it may stop closing properly. The contents in the stomach then may leak back, or reflux, into the esophagus. This problem is called gastroesophageal reflux disease (GERD). Symptoms of GERD include heartburn, belching, regurgitation of stomach contents, and swallowing difficulties.    Sometimes, the stomach acid travels up through the esophagus and spills into the larynx or pharynx (voice box). This is called laryngopharyngeal reflux (LPR) and is irritating to the vocal folds and surrounding tissues. Often, patients with LPR do not experience heartburn as a symptom. More commonly, symptoms of LPR include hoarseness, excessive mucous resulting in frequent throat clearing, post-nasal drip, coughing, throat soreness or burning, choking episodes, difficulty swallowing, and sensation of a lump in the throat.     How is acid reflux treated?   Treatment for acid reflux can involve any combination of medication, lifestyle modifications, and surgery.   Medications. Your doctor may prescribe a proton pump inhibitor (PPI) or an H2 blocker. If you are prescribed a PPI, take in on an empty stomach in the morning 30 minutes prior to eating breakfast. Keep in mind that it may take 4-6 weeks before symptoms begin to resolve, so do not stop medications without consulting your doctor.   Lifestyle and dietary modifications. Eat smaller meals at a slower pace. Avoid over-eating. If you are overweight, try to lose weight. Do not lie down or exercise directly after eating; eat your last meal of the day at least 2-3 hours prior to going to sleep. Avoid tight-fitting clothes. If you  are a smoker, reduce or quit smoking. Elevate your head of bed 4-6 inches by putting phone books under the legs at the head of your bed or buy a wedge pillow, but do not use more than two regular pillows as this causes the body to curl and compresses your stomach.     Food group Foods to avoid to reduce reflux   Beverages  Whole milk, 2% milk, chocolate milk/hot chocolate, alcohol, coffee (regular and decaf), caffeinated tea, mint tea, carbonated beverages, citrus juice    Breads/grains Commercial sweet rolls, doughnuts, croissants, and other high-fat pastries    Fruits and vegetables Fried or cream-style vegetables, tomatoes, tomato-based products, citrus fruits, hot peppers    Soups and seasonings Cream, cheese, tomato-based soups, vinegar    Meats and proteins Fatty or fried meat/fish, garvey, sausage, pepperoni, lunch meat, fried eggs    Fats and oil Lard, garvey drippings, salt pork, meat drippings, gravies, highly seasoned salad dressings, nuts    Sweets/desserts Anything made with or from chocolate, peppermint, spearmint, whole milk, or cream; high-fat pastries, gum, hard candy

## 2023-04-04 ENCOUNTER — TELEPHONE (OUTPATIENT)
Dept: SPEECH THERAPY | Facility: HOSPITAL | Age: 63
End: 2023-04-04

## 2023-04-04 NOTE — TELEPHONE ENCOUNTER
Attempted to sw someone to schedule pt. The person who schedules was gone for the day. I will call the nursing [place] tomorrow [phone].

## 2023-04-05 ENCOUNTER — TELEPHONE (OUTPATIENT)
Dept: SPEECH THERAPY | Facility: HOSPITAL | Age: 63
End: 2023-04-05
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
## 2023-05-25 PROBLEM — N18.30 ACUTE RENAL FAILURE SUPERIMPOSED ON STAGE 3 CHRONIC KIDNEY DISEASE: Status: ACTIVE | Noted: 2017-09-30
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
## 2023-05-28 PROBLEM — N39.0 UTI (URINARY TRACT INFECTION): Status: RESOLVED | Noted: 2019-05-24 | Resolved: 2023-05-28

## 2023-06-19 PROBLEM — I73.9 CLAUDICATION OF UPPER EXTREMITY: Status: ACTIVE | Noted: 2023-06-19

## 2023-08-28 PROBLEM — N17.9 ACUTE RENAL FAILURE SUPERIMPOSED ON STAGE 3 CHRONIC KIDNEY DISEASE: Status: RESOLVED | Noted: 2017-09-30 | Resolved: 2023-08-28

## 2023-08-28 PROBLEM — N18.30 ACUTE RENAL FAILURE SUPERIMPOSED ON STAGE 3 CHRONIC KIDNEY DISEASE: Status: RESOLVED | Noted: 2017-09-30 | Resolved: 2023-08-28

## 2024-04-06 PROBLEM — E87.6 HYPOKALEMIA: Status: ACTIVE | Noted: 2024-04-06

## 2024-04-06 PROBLEM — R78.81 BACTEREMIA: Status: ACTIVE | Noted: 2024-04-06

## 2024-04-06 PROBLEM — A41.9 SEVERE SEPSIS: Status: ACTIVE | Noted: 2024-04-06

## 2024-04-06 PROBLEM — E87.1 HYPONATREMIA: Status: ACTIVE | Noted: 2024-04-06

## 2024-04-06 PROBLEM — R65.20 SEVERE SEPSIS: Status: ACTIVE | Noted: 2024-04-06

## 2024-04-12 PROBLEM — R63.8 INADEQUATE ORAL INTAKE: Status: ACTIVE | Noted: 2024-04-12

## 2024-05-13 PROBLEM — N20.0 LEFT RENAL STONE: Status: ACTIVE | Noted: 2024-05-13

## 2024-05-13 PROBLEM — R31.29 MICROHEMATURIA: Status: ACTIVE | Noted: 2024-05-13

## 2024-05-13 PROBLEM — T83.122A DISPLACEMENT OF INDWELLING URETERAL STENT: Status: ACTIVE | Noted: 2024-05-13

## 2024-07-08 PROBLEM — N39.0 UTI (URINARY TRACT INFECTION): Status: RESOLVED | Noted: 2019-05-24 | Resolved: 2024-07-08

## 2024-07-08 PROBLEM — J96.01 ACUTE HYPOXEMIC RESPIRATORY FAILURE: Status: RESOLVED | Noted: 2018-10-08 | Resolved: 2024-07-08

## 2024-07-08 PROBLEM — R65.20 SEVERE SEPSIS: Status: RESOLVED | Noted: 2024-04-06 | Resolved: 2024-07-08

## 2024-07-08 PROBLEM — N17.9 AKI (ACUTE KIDNEY INJURY): Status: RESOLVED | Noted: 2018-10-13 | Resolved: 2024-07-08

## 2024-07-08 PROBLEM — A41.9 SEVERE SEPSIS: Status: RESOLVED | Noted: 2024-04-06 | Resolved: 2024-07-08

## 2024-10-02 PROBLEM — R77.8 ABNORMAL SPEP: Status: ACTIVE | Noted: 2024-10-02

## 2024-10-02 PROBLEM — Z71.89 ENCOUNTER TO DISCUSS TREATMENT OPTIONS: Status: ACTIVE | Noted: 2024-10-02

## 2024-10-02 PROBLEM — Z71.9 ENCOUNTER FOR HEALTH EDUCATION: Status: ACTIVE | Noted: 2024-10-02

## 2024-10-02 PROBLEM — Z71.2 ENCOUNTER TO DISCUSS TEST RESULTS: Status: ACTIVE | Noted: 2024-10-02

## 2024-10-24 PROBLEM — N17.9 AKI (ACUTE KIDNEY INJURY): Status: ACTIVE | Noted: 2024-10-24

## 2024-10-24 PROBLEM — D53.9 NUTRITIONAL ANEMIA, UNSPECIFIED: Status: ACTIVE | Noted: 2024-10-24

## 2024-10-24 PROBLEM — D51.9 ANEMIA DUE TO VITAMIN B12 DEFICIENCY: Status: ACTIVE | Noted: 2024-10-24

## 2025-01-29 PROBLEM — K29.40 ATROPHIC GASTRITIS WITHOUT HEMORRHAGE: Status: ACTIVE | Noted: 2025-01-29

## 2025-02-19 PROBLEM — D89.0 POLYCLONAL GAMMOPATHY: Status: ACTIVE | Noted: 2025-02-19

## 2025-06-19 ENCOUNTER — TELEPHONE (OUTPATIENT)
Dept: SURGERY | Facility: CLINIC | Age: 65
End: 2025-06-19
Payer: MEDICAID

## 2025-06-19 NOTE — TELEPHONE ENCOUNTER
06/19/25      1041 am  Spoke to nurse (Nga) regarding patients appointment. Informed her that we reschedule patients appointment for 07/03/25 to 07/02/25 at 9 am at the [place] location since she is in a nursing home. [place] location is closer to her than [place]. Nurse confirm appointment. Nurse stated if patient needed to fast informed her it would be best in case Dr. Daugherty orders any lab work. Patient nurse Nga verbalized understanding.

## 2025-07-02 ENCOUNTER — OFFICE VISIT (OUTPATIENT)
Facility: CLINIC | Age: 65
End: 2025-07-02
Payer: MEDICARE

## 2025-07-02 VITALS
HEIGHT: 65 IN | DIASTOLIC BLOOD PRESSURE: 86 MMHG | BODY MASS INDEX: 38.82 KG/M2 | HEART RATE: 72 BPM | TEMPERATURE: 98 F | SYSTOLIC BLOOD PRESSURE: 167 MMHG | WEIGHT: 233 LBS

## 2025-07-02 DIAGNOSIS — E04.2 MULTIPLE THYROID NODULES: ICD-10-CM

## 2025-07-02 DIAGNOSIS — E03.9 PRIMARY HYPOTHYROIDISM: ICD-10-CM
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
## 2025-07-25 ENCOUNTER — OFFICE VISIT (OUTPATIENT)
Dept: SURGERY | Facility: CLINIC | Age: 65
End: 2025-07-25
Payer: MEDICARE

## 2025-07-25 DIAGNOSIS — Z01.818 PRE-OP TESTING: Primary | ICD-10-CM

## 2025-07-30 PROBLEM — D51.0 PERNICIOUS ANEMIA: Status: ACTIVE | Noted: 2025-07-30

## 2025-07-30 PROBLEM — D75.9 CLONAL CYTOPENIA OF UNDETERMINED SIGNIFICANCE (CCUS): Status: ACTIVE | Noted: 2025-07-30
